# Patient Record
Sex: FEMALE | Race: AMERICAN INDIAN OR ALASKA NATIVE | Employment: OTHER | URBAN - METROPOLITAN AREA
[De-identification: names, ages, dates, MRNs, and addresses within clinical notes are randomized per-mention and may not be internally consistent; named-entity substitution may affect disease eponyms.]

---

## 2017-05-05 ENCOUNTER — OFFICE VISIT (OUTPATIENT)
Dept: CARDIOLOGY CLINIC | Age: 62
End: 2017-05-05

## 2017-05-05 VITALS
BODY MASS INDEX: 33.62 KG/M2 | RESPIRATION RATE: 18 BRPM | WEIGHT: 209.2 LBS | HEART RATE: 76 BPM | SYSTOLIC BLOOD PRESSURE: 126 MMHG | DIASTOLIC BLOOD PRESSURE: 90 MMHG | HEIGHT: 66 IN | OXYGEN SATURATION: 95 %

## 2017-05-05 DIAGNOSIS — I10 ESSENTIAL HYPERTENSION: ICD-10-CM

## 2017-05-05 DIAGNOSIS — R07.9 CHEST PAIN, UNSPECIFIED TYPE: Primary | ICD-10-CM

## 2017-05-05 DIAGNOSIS — E78.2 MIXED HYPERLIPIDEMIA: ICD-10-CM

## 2017-05-05 DIAGNOSIS — R00.2 HEART PALPITATIONS: ICD-10-CM

## 2017-05-05 RX ORDER — BISMUTH SUBSALICYLATE 262 MG
1 TABLET,CHEWABLE ORAL DAILY
COMMUNITY

## 2017-05-05 RX ORDER — IBUPROFEN 800 MG/1
TABLET ORAL
COMMUNITY

## 2017-05-05 NOTE — PROGRESS NOTES
Chief Complaint   Patient presents with   [facility] Patient     chest pain in middle of chest that radiates to shoulder on and off

## 2017-05-05 NOTE — PROGRESS NOTES
[id number] [facility]        [phone]                             NEW PATIENT HPI/FOLLOW-UP    NAME:  Jonh Neumann   :   1955   MRN:   [id number]   PCP:  Nancy Morejon MD           Subjective: The patient is a 57y.o. year old female with PMHx of dyslipidemia, HTN, chest pain  with normal stress test who returns after long hiatus with hx of recurrent precordial chest tightness more left sided radiating into left shoulder and jaw. Occurred 1 week ago at rest. Lasted few hrs though unsure. No recurrence. Felt it may have been due to sleeping wrong. Also admits to sweta episodes of rapid palpitations which occur at anytime( not at night awakening from sleep) every few months and transient. Denies change in exercise tolerance, edema, medication intolerance, shortness of breath unless really pushing herself, PND/orthopnea wheezing, sputum, syncope, dizziness or light headedness.       Review of Systems:     [] Unable to obtain  ROS due to  []mental status change  []sedated   []intubated   [x]Total of 12 systems reviewed as follows:  Constitutional: negative fever, negative chills, negative weight loss  Eyes:   negative visual changes  ENT:   negative sore throat, tongue or lip swelling  Chest/Resp:  negative cough, wheezing, negative dyspnea,tenderness  Cards:  +chest pain, -decreased exercise endurance, +palpitations, lower extremity edema,PND,orthopnea,syncope,dizziness,lightheadedness  GI:   negative for nausea, vomiting, diarrhea, and abdominal pain  :  negative for frequency, dysuria  Integument:  negative for rash and pruritus  Heme:  negative for easy bruising and gum/nose bleeding  Musculoskel: negative for myalgias,  back pain and muscle weakness  Neuro:  negative for headaches, dizziness, vertigo  Psych:  negative for feelings of anxiety, depression     Past Medical History:   Diagnosis Date    Arthritis     GERD (gastroesophageal reflux disease)  Hypertension     Ill-defined condition     elevated cholesterol    Ill-defined condition     seasonal allergies     Patient Active Problem List    Diagnosis Date Noted    Chest pain 05/05/2017    Mixed hyperlipidemia 05/05/2017    Essential hypertension 05/05/2017      Past Surgical History:   Procedure Laterality Date    BREAST SURGERY PROCEDURE UNLISTED      cysts removed bilateral breasts    HX ORTHOPAEDIC  10/27/15    C4-C6 ANTERIOR CERVICAL DISCECTOMY WITH FUSION    HX OTHER SURGICAL      colonoscopy     Allergies   Allergen Reactions    Latex Rash      Family History   Problem Relation Age of Onset    Diabetes Mother     Lung Disease Father     Breast Cancer Sister      52's      Social History     Social History    Marital status: SINGLE     Spouse name: N/A    Number of children: N/A    Years of education: N/A     Occupational History    Not on file. Social History Main Topics    Smoking status: Former Smoker     Packs/day: 0.25     Years: 0.50    Smokeless tobacco: Never Used    Alcohol use Yes      Comment: rare    Drug use: No    Sexual activity: Not on file     Other Topics Concern    Not on file     Social History Narrative      Current Outpatient Prescriptions   Medication Sig    multivitamin (ONE A DAY) tablet Take 1 Tab by mouth daily.  ibuprofen (MOTRIN) 800 mg tablet Take  by mouth.  oxyCODONE IR (ROXICODONE) 10 mg tab immediate release tablet Take 0.5-1 Tabs by mouth every three (3) hours as needed. Max Daily Amount: 80 mg.    diazepam (VALIUM) 5 mg tablet Take 1 Tab by mouth every eight (8) hours as needed (Spasms). Max Daily Amount: 15 mg.  cholecalciferol, vitamin D3, (VITAMIN D3) 2,000 unit tab Take 2,000 Units by mouth. Indications: VITAMIN D DEFICIENCY    B.infantis-B.ani-B.long-B.bifi (PROBIOTIC 4X) 10-15 mg TbEC Take 1 Tab by mouth daily.  aspirin delayed-release 81 mg tablet Take 81 mg by mouth daily.     Cetirizine (ZYRTEC) 10 mg cap Take  by mouth.    Hydrochlorothiazide (HYDRODIURIL) 12.5 mg tablet Take 12.5 mg by mouth daily.  simvastatin (ZOCOR) 20 mg tablet Take 20 mg by mouth nightly. No current facility-administered medications for this visit. I have reviewed the nurses notes, vitals, problem list, allergy list, medical history, family medical, social history and medications. Objective:     Physical Exam:     Vitals:    05/05/17 1423 05/05/17 1436   BP: 130/90 126/90   Pulse: 76    Resp: 18    SpO2: 95%    Weight: 209 lb 3.2 oz (94.9 kg)    Height: 5' 6\" (1.676 m)     Body mass index is 33.77 kg/(m^2). General: Well developed, in no acute distress. HEENT: No carotid bruits, no JVD, trach is midline. Heart:  Normal S1/S2 negative S3 or S4. Regular, soft Gr 1/6 systolic murmur, -gallop or rub.   Respiratory: Clear bilaterally, no wheezing or rales  Abdomen:   Soft, non-tender, bowel sounds are active.   Extremities:  No edema, normal cap refill, no cyanosis. Neuro: A&Ox3, speech clear, gait stable. Skin: Skin color is normal. No rashes or lesions. No diaphoresis. Vascular: 2+ pulses symmetric in all extremities        Data Review:       Cardiographics:    EKG: NSR with barely perceptible P waves--can't exclude ectopic atrial rhythm.     Cardiology Labs:    Results for orders placed or performed during the hospital encounter of 10/20/15   EKG, 12 LEAD, INITIAL   Result Value Ref Range    Ventricular Rate 69 BPM    Atrial Rate 69 BPM    P-R Interval 158 ms    QRS Duration 84 ms    Q-T Interval 410 ms    QTC Calculation (Bezet) 439 ms    Calculated P Axis 63 degrees    Calculated R Axis 45 degrees    Calculated T Axis 56 degrees    Diagnosis       Normal sinus rhythm with sinus arrhythmia  Nonspecific T wave abnormality  Confirmed by Jojo Carbajal ([id number]) on 10/20/2015 10:32:27 PM         No results found for: CHOL, CHOLX, CHLST, CHOLV, 687754, HDL, LDL, DLDL, LDLC, DLDLP, TGL, TGLX, TRIGL, [id number], TRIGP, [facility], [facility]    Lab Results   Component Value Date/Time    Sodium 143 10/20/2015 09:30 AM    Potassium 3.7 10/20/2015 09:30 AM    Chloride 105 10/20/2015 09:30 AM    CO2 30 10/20/2015 09:30 AM    Anion gap 8 10/20/2015 09:30 AM    Glucose 93 10/20/2015 09:30 AM    BUN 9 10/20/2015 09:30 AM    Creatinine 0.70 10/20/2015 09:30 AM    BUN/Creatinine ratio 13 10/20/2015 09:30 AM    GFR est AA >60 10/20/2015 09:30 AM    GFR est non-AA >60 10/20/2015 09:30 AM    Calcium 8.7 10/20/2015 09:30 AM    Bilirubin, total 0.4 10/20/2015 09:30 AM    AST (SGOT) 17 10/20/2015 09:30 AM    Alk. phosphatase 87 10/20/2015 09:30 AM    Protein, total 7.7 10/20/2015 09:30 AM    Albumin 3.8 10/20/2015 09:30 AM    Globulin 3.9 10/20/2015 09:30 AM    A-G Ratio 1.0 10/20/2015 09:30 AM    ALT (SGPT) 22 10/20/2015 09:30 AM          Assessment:       ICD-10-CM ICD-9-CM    1. Chest pain, atypical R07.9 786.50 AMB POC EKG ROUTINE W/ 12 LEADS, INTER & REP      STRESS TEST CARDIAC      2D ECHO COMPLETE ADULT (TTE) W OR WO CONTR   2. Mixed hyperlipidemia E78.2 272.2 STRESS TEST CARDIAC      2D ECHO COMPLETE ADULT (TTE) W OR WO CONTR   3. Essential hypertension I10 401.9 STRESS TEST CARDIAC      2D ECHO COMPLETE ADULT (TTE) W OR WO CONTR   4. Heart palpitations R00.2 785.1 STRESS TEST CARDIAC      2D ECHO COMPLETE ADULT (TTE) W OR WO CONTR         Discussion: Patient presents at this time with recurrent atypical chest pain likely MS/costochondral. Doubt cardiac but will exclude with routine stress test. Will obtain echo to exclude structural heart disease. EM or ILR if palpitations more frequent. Call should this occur. Plan: 1. Continue same meds. Lipid profile and labs followed by PCP. 2.Encouraged to exercise to tolerance, lose weight and follow low fat, low cholesterol, low sodium predominantly Plant-based (consider Mediterranean) diet. Call with questions or concerns. Will follow up any test results by phone and/or f/u here in office if needed. Jayden Kerr 3.Follow up: 6 months    I have discussed the diagnosis with the patient and the intended plan as seen in the above orders. The patient has received an after-visit summary and questions were answered concerning future plans. I have discussed any concerning medication side effects and warnings with the patient as well.     Doreen Fernandez MD  5/5/2017

## 2017-05-09 ENCOUNTER — CLINICAL SUPPORT (OUTPATIENT)
Dept: CARDIOLOGY CLINIC | Age: 62
End: 2017-05-09

## 2017-05-09 DIAGNOSIS — I10 ESSENTIAL HYPERTENSION: ICD-10-CM

## 2017-05-09 DIAGNOSIS — R07.9 CHEST PAIN, UNSPECIFIED TYPE: ICD-10-CM

## 2017-05-09 DIAGNOSIS — R00.2 HEART PALPITATIONS: ICD-10-CM

## 2017-05-09 DIAGNOSIS — E78.2 MIXED HYPERLIPIDEMIA: ICD-10-CM

## 2017-05-10 ENCOUNTER — CLINICAL SUPPORT (OUTPATIENT)
Dept: CARDIOLOGY CLINIC | Age: 62
End: 2017-05-10

## 2017-05-10 DIAGNOSIS — R00.2 HEART PALPITATIONS: ICD-10-CM

## 2017-05-10 DIAGNOSIS — I10 ESSENTIAL HYPERTENSION: ICD-10-CM

## 2017-05-10 DIAGNOSIS — E78.2 MIXED HYPERLIPIDEMIA: ICD-10-CM

## 2017-05-10 DIAGNOSIS — R07.9 CHEST PAIN, UNSPECIFIED TYPE: ICD-10-CM

## 2017-05-11 ENCOUNTER — TELEPHONE (OUTPATIENT)
Dept: CARDIOLOGY CLINIC | Age: 62
End: 2017-05-11

## 2017-05-11 NOTE — TELEPHONE ENCOUNTER
Spoke with patient. Verified patient with two patient identifiers. Advised plain EST was abnormal.  Agrees to proceed with Nuc EST. Advised if that test was abnormal, would need cardiac cath. Will ask PSR to call pt to schedule Nuc EST. Patient verbalized understanding. Was abnormal                  Let her know would recommend walking nuclear medicine stress test or cardiac cath                  Would recommend walking NST to exclude blocked arteries.  Tell her would rather do this NST first then cardiac cath if abnormal.                  thx                  b

## 2017-05-15 ENCOUNTER — TELEPHONE (OUTPATIENT)
Dept: CARDIOLOGY CLINIC | Age: 62
End: 2017-05-15

## 2017-05-15 NOTE — TELEPHONE ENCOUNTER
----- Message from Ben Murray MD sent at 5/14/2017  9:51 PM EDT -----  Regarding: echo  Normal except for mild leakage    Repeat 1-2 yrs    b

## 2017-05-15 NOTE — TELEPHONE ENCOUNTER
Spoke with patient. Verified patient with two patient identifiers. Discussed echo results with pt. Repeat echo in 1-2 years. Keep appt for card EST next week. Patient verbalized understanding.

## 2017-05-23 ENCOUNTER — CLINICAL SUPPORT (OUTPATIENT)
Dept: CARDIOLOGY CLINIC | Age: 62
End: 2017-05-23

## 2017-05-23 DIAGNOSIS — R07.9 CHEST PAIN, UNSPECIFIED TYPE: Primary | ICD-10-CM

## 2017-05-25 ENCOUNTER — TELEPHONE (OUTPATIENT)
Dept: CARDIOLOGY CLINIC | Age: 62
End: 2017-05-25

## 2017-05-25 NOTE — TELEPHONE ENCOUNTER
----- Message from [address], MD sent at 5/25/2017 12:47 AM EDT -----  Stress test normal, thx.

## 2017-05-26 NOTE — PROGRESS NOTES
Spoke with patient  Verified patient with 2 patient identifier  Informed per Dr German Guallpa stress test NORMAL     F/U TO DISCUSS IF DESIRED  Patient verbalized understanding.

## 2017-06-06 ENCOUNTER — TELEPHONE (OUTPATIENT)
Dept: CARDIOLOGY CLINIC | Age: 62
End: 2017-06-06

## 2017-06-07 NOTE — TELEPHONE ENCOUNTER
Spoke with patient. Verified patient with two patient identifiers. Discussed echo results again. Reiterated mildly leaky. Of no concern at this time. Repeat echo 1-2 years. Patient verbalized understanding.

## 2017-10-24 ENCOUNTER — HOSPITAL ENCOUNTER (OUTPATIENT)
Dept: MAMMOGRAPHY | Age: 62
Discharge: HOME | End: 2017-10-24
Attending: OBSTETRICS & GYNECOLOGY
Payer: COMMERCIAL

## 2017-10-24 DIAGNOSIS — Z12.31 VISIT FOR SCREENING MAMMOGRAM: ICD-10-CM

## 2017-10-24 PROCEDURE — 77067 SCR MAMMO BI INCL CAD: CPT

## 2017-10-26 ENCOUNTER — HOSPITAL ENCOUNTER (OUTPATIENT)
Dept: MAMMOGRAPHY | Age: 62
Discharge: HOME | End: 2017-10-26
Attending: OBSTETRICS & GYNECOLOGY
Payer: COMMERCIAL

## 2017-10-26 DIAGNOSIS — R92.8 ABNORMAL MAMMOGRAM: ICD-10-CM

## 2017-10-26 PROCEDURE — 77065 DX MAMMO INCL CAD UNI: CPT

## 2017-10-26 NOTE — PROGRESS NOTES
Dr. Mitra Cruz informed nurse that she just talked with Dr. Hiram Cloud and he stated stereotactic left breast biopsy could be completed here.

## 2017-10-26 NOTE — PROGRESS NOTES
Mammo result routed to Dr. Rosie Garcia. Faxed order sheet to Dr. Tom Borges to sign and refax so pt. Can be scheduled for her appt. For her procedure.

## 2017-10-31 ENCOUNTER — HOSPITAL ENCOUNTER (OUTPATIENT)
Dept: MAMMOGRAPHY | Age: 62
Discharge: HOME | End: 2017-10-31
Attending: OBSTETRICS & GYNECOLOGY
Payer: COMMERCIAL

## 2017-10-31 VITALS — RESPIRATION RATE: 20 BRPM | BODY MASS INDEX: 32.62 KG/M2 | HEIGHT: 66 IN | WEIGHT: 203 LBS

## 2017-10-31 DIAGNOSIS — R92.8 ABNORMAL MAMMOGRAM: ICD-10-CM

## 2017-10-31 DIAGNOSIS — R92.0 MAMMOGRAPHIC MICROCALCIFICATION: ICD-10-CM

## 2017-10-31 PROCEDURE — 77065 DX MAMMO INCL CAD UNI: CPT

## 2017-10-31 PROCEDURE — 19081 BX BREAST 1ST LESION STRTCTC: CPT

## 2017-10-31 PROCEDURE — 74011000250 HC RX REV CODE- 250: Performed by: OBSTETRICS & GYNECOLOGY

## 2017-10-31 PROCEDURE — 88305 TISSUE EXAM BY PATHOLOGIST: CPT | Performed by: OBSTETRICS & GYNECOLOGY

## 2017-10-31 PROCEDURE — 74011000250 HC RX REV CODE- 250: Performed by: RADIOLOGY

## 2017-10-31 RX ORDER — LIDOCAINE HYDROCHLORIDE 10 MG/ML
12 INJECTION, SOLUTION EPIDURAL; INFILTRATION; INTRACAUDAL; PERINEURAL
Status: COMPLETED | OUTPATIENT
Start: 2017-10-31 | End: 2017-10-31

## 2017-10-31 RX ORDER — SODIUM BICARBONATE 84 MG/ML
2.5 INJECTION, SOLUTION INTRAVENOUS
Status: COMPLETED | OUTPATIENT
Start: 2017-10-31 | End: 2017-10-31

## 2017-10-31 RX ADMIN — LIDOCAINE HYDROCHLORIDE 8 MG: 10; .005 INJECTION, SOLUTION EPIDURAL; INFILTRATION; INTRACAUDAL; PERINEURAL at 12:00

## 2017-10-31 RX ADMIN — SODIUM BICARBONATE 2.5 MEQ: 84 INJECTION, SOLUTION INTRAVENOUS at 17:00

## 2017-10-31 RX ADMIN — LIDOCAINE HYDROCHLORIDE 12 ML: 10 INJECTION, SOLUTION EPIDURAL; INFILTRATION; INTRACAUDAL; PERINEURAL at 12:00

## 2017-10-31 NOTE — ROUTINE PROCESS
Dr. Iline Snellen Present for pre procedure consult and consent - questions reviewed with understanding - consent signed. Discharge instructions given and reviewed in entirety - copy given to pt - Ms. Jarvis Peralta verbalized and signed for understanding of all reviewed - will review again at discharge.

## 2017-10-31 NOTE — ROUTINE PROCESS
Discharge instructions reviewed with understanding, questions reviewed, copy given to patient. Post procedure Mammo completed and reviewed MD, pt cleared for discharge. Biopsy site clean and dry, hemostasis achieved. Steri strips with DSD placed. Ice pack held by patient. Pt verbalized she would like to be notified by phone of any results. Pt encouraged to call department if she has not received her results in 3-5 business days. Pt advised not answer cell phone call while driving. Specimen carried to lab by RN.

## 2017-10-31 NOTE — DISCHARGE INSTRUCTIONS
MD Terence Aceves, RN     [phone]                                        Instructions Following Your Breast Biopsy         Pain Control    · Tylenol should be taken as directed on the back of the bottle (janey 4-6 hours) for the next 24 hours post breast biopsy unless directed otherwise by a physician. · No Aspirin or Anti-Inflammatory  (Motrin, Advil ) medications for 24 hours. · Wearing a soft, comfortable (Wire free ) bra, even at night, for 24 hours is helpful. · Use a clean, covered ice pack over the site every 1- 2 hours til bedtime, for 20-30 minutes at a time for the first 24 hours. Biopsy Site     · A small amount of bleeding and /or oozing from the Biopsy site is normal, as well as bloody nipple discharge, which is rare. · You may notice bruising on the skin over the next few days. · Steri Strips and a dressing have been applied. · The steri strips can remain on for up to 5 days. · The clean dressing can be removed in 48 hours, however, if the dressing becomes loose, causes redness or irritation of the skin or any drainage has collected, please remove it an replace it with a Band-Aid. · Avoid getting the Biopsy site wet for 48 hours, avoid showering, swimming and hot tubs. · Should you have excessive bleeding or pain, please seek medical treatment or call                 [address] [phone], 7:30 - 4:00 p.m. After hours (ask to speak to the on-call Radiology Nurse-RN)                        [phone] or [phone]      Activity      · Rest the day of the biopsy, avoiding strenuous activities and any heavy lifting. · You may resume most activities/ work the following day. Pathology Report     · The results of your Pathology report, from the laboratory, should be available in 3-5 business days.   The Radiologist will review your results and, based on your preference, we will be happy to provide results to you by phone or in person. · You will also be advised, at that time, of any further appointments or follow- up you may need.

## 2017-10-31 NOTE — IP AVS SNAPSHOT
[address] [facility]. 
[phone] Patient: Carolyn Valente MRN: [id number] SGY:[birth year] About your hospitalization You were admitted on:  October 31, 2017 You last received care in the:  [address] [place] [address] You were discharged on:  October 31, 2017 Why you were hospitalized Your primary diagnosis was:  Not on File Discharge Orders None A check sobia indicates which time of day the medication should be taken. My Medications ASK your physician about these medications Instructions Each Dose to Equal  
 Morning Noon Evening Bedtime  
 aspirin delayed-release 81 mg tablet Your last dose was: Your next dose is: Take 81 mg by mouth daily. 81 mg  
    
   
   
   
  
 diazePAM 5 mg tablet Commonly known as:  VALIUM Your last dose was: Your next dose is: Take 1 Tab by mouth every eight (8) hours as needed (Spasms). Max Daily Amount: 15 mg.  
 5 mg  
    
   
   
   
  
 hydroCHLOROthiazide 12.5 mg tablet Commonly known as:  HYDRODIURIL Your last dose was: Your next dose is: Take 12.5 mg by mouth daily. 12.5 mg  
    
   
   
   
  
 ibuprofen 800 mg tablet Commonly known as:  MOTRIN Your last dose was: Your next dose is: Take  by mouth.  
     
   
   
   
  
 multivitamin tablet Commonly known as:  ONE A DAY Your last dose was: Your next dose is: Take 1 Tab by mouth daily. 1 Tab  
    
   
   
   
  
 oxyCODONE IR 10 mg Tab immediate release tablet Commonly known as:  Charolet Music Your last dose was: Your next dose is: Take 0.5-1 Tabs by mouth every three (3) hours as needed. Max Daily Amount: 80 mg.  
 5-10 mg PROBIOTIC 4X 10-15 mg Tbec Generic drug:  B.infantis-B.ani-B.long-B.bifi Your last dose was: Your next dose is: Take 1 Tab by mouth daily. 1 Tab  
    
   
   
   
  
 simvastatin 20 mg tablet Commonly known as:  ZOCOR Your last dose was: Your next dose is: Take 20 mg by mouth nightly. 20 mg  
    
   
   
   
  
 VITAMIN D3 2,000 unit Tab Generic drug:  cholecalciferol (vitamin D3) Your last dose was: Your next dose is: Take 2,000 Units by mouth. Indications: VITAMIN D DEFICIENCY  
 2000 Units ZyrTEC 10 mg Cap Generic drug:  Cetirizine Your last dose was: Your next dose is: Take  by mouth. Discharge Instructions MD Jason Donaldson RT Arneta Reach, RN     [phone] Instructions Following Your Breast Biopsy Pain Control · Tylenol should be taken as directed on the back of the bottle (janey 4-6 hours) for the next 24 hours post breast biopsy unless directed otherwise by a physician. · No Aspirin or Anti-Inflammatory  (Motrin, Advil ) medications for 24 hours. · Wearing a soft, comfortable (Wire free ) bra, even at night, for 24 hours is helpful. · Use a clean, covered ice pack over the site every 1- 2 hours til bedtime, for 20-30 minutes at a time for the first 24 hours. Biopsy Site · A small amount of bleeding and /or oozing from the Biopsy site is normal, as well as bloody nipple discharge, which is rare. · You may notice bruising on the skin over the next few days. · Steri Strips and a dressing have been applied. · The steri strips can remain on for up to 5 days.  
· The clean dressing can be removed in 48 hours, however, if the dressing becomes loose, causes redness or irritation of the skin or any drainage has collected, please remove it an replace it with a Band-Aid. · Avoid getting the Biopsy site wet for 48 hours, avoid showering, swimming and hot tubs. · Should you have excessive bleeding or pain, please seek medical treatment or call [address] [phone], 7:30 - 4:00 p.m. After hours (ask to speak to the on-call Radiology Nurse-RN)                        [phone] or [phone] Activity · Rest the day of the biopsy, avoiding strenuous activities and any heavy lifting. · You may resume most activities/ work the following day. Pathology Report · The results of your Pathology report, from the laboratory, should be available in 3-5 business days. The Radiologist will review your results and, based on your preference, we will be happy to provide results to you by phone or in person. · You will also be advised, at that time, of any further appointments or follow- up you may need. Introducing [facility]! Peggy Love introduces P2i patient portal. Now you can access parts of your medical record, email your doctor's office, and request medication refills online. 1. In your internet browser, go to [email or web address] 2. Click on the First Time User? Click Here link in the Sign In box. You will see the New Member Sign Up page. 3. Enter your P2i Access Code exactly as it appears below. You will not need to use this code after youve completed the sign-up process. If you do not sign up before the expiration date, you must request a new code. · P2i Access Code: [email or web address] Expires: 1/11/2018 11:33 AM 
 
4. Enter the last four digits of your Social Security Number (xxxx) and Date of Birth (mm/dd/yyyy) as indicated and click Submit. You will be taken to the next sign-up page. 5. Create a P2i ID.  This will be your P2i login ID and cannot be changed, so think of one that is secure and easy to remember. 6. Create a Work4 password. You can change your password at any time. 7. Enter your Password Reset Question and Answer. This can be used at a later time if you forget your password. 8. Enter your e-mail address. You will receive e-mail notification when new information is available in [address] 9. Click Sign Up. You can now view and download portions of your medical record. 10. Click the Download Summary menu link to download a portable copy of your medical information. If you have questions, please visit the Frequently Asked Questions section of the Work4 website. Remember, Work4 is NOT to be used for urgent needs. For medical emergencies, dial 911. Now available from your iPhone and Android! Providers Seen During Your Hospitalization Provider Specialty Primary office phone Roman Moscoso MD Gynecology [phone] Your Primary Care Physician (PCP) Primary Care Physician Office Phone Office Fax Steph Moeller [phone] You are allergic to the following Allergen Reactions Latex Rash Recent Documentation Height Weight Breastfeeding? BMI OB Status Smoking Status 1.676 m 92.1 kg No 32.77 kg/m2 Postmenopausal Former Smoker Emergency Contacts Name Discharge Info Relation Home Work Mobile Crystal Pickett DISCHARGE CAREGIVER [3] Other Relative [6] [phone] Patient Belongings The following personal items are in your possession at time of discharge: 
     Visual Aid: Glasses Please provide this summary of care documentation to your next provider. Signatures-by signing, you are acknowledging that this After Visit Summary has been reviewed with you and you have received a copy. Patient Signature:  ____________________________________________________________ Date:  ____________________________________________________________  
  
Carla  Provider Signature:  ____________________________________________________________ Date:  ____________________________________________________________

## 2017-11-01 NOTE — PROGRESS NOTES
Dr. Renny Valencia reviewed pathology report and recommended to continue yearly screening mammogram follow up. Ms Teodora Cortés was notified of benign results and recommendation. Dr. Shahid Corley was faxed a copy of the pathology report with follow up recomendations. Letter sent to pt.

## 2018-11-09 ENCOUNTER — HOSPITAL ENCOUNTER (OUTPATIENT)
Dept: MAMMOGRAPHY | Age: 63
Discharge: HOME | End: 2018-11-09
Attending: OBSTETRICS & GYNECOLOGY
Payer: MEDICARE

## 2018-11-09 DIAGNOSIS — Z12.39 SCREENING BREAST EXAMINATION: ICD-10-CM

## 2018-11-09 PROCEDURE — 77067 SCR MAMMO BI INCL CAD: CPT

## 2019-03-19 ENCOUNTER — HOSPITAL ENCOUNTER (EMERGENCY)
Age: 64
Discharge: HOME | End: 2019-03-19
Attending: EMERGENCY MEDICINE
Payer: MEDICARE

## 2019-03-19 ENCOUNTER — APPOINTMENT (OUTPATIENT)
Dept: GENERAL RADIOLOGY | Age: 64
End: 2019-03-19
Attending: EMERGENCY MEDICINE
Payer: MEDICARE

## 2019-03-19 VITALS
DIASTOLIC BLOOD PRESSURE: 62 MMHG | SYSTOLIC BLOOD PRESSURE: 137 MMHG | BODY MASS INDEX: 33.87 KG/M2 | HEIGHT: 65 IN | RESPIRATION RATE: 17 BRPM | WEIGHT: 203.26 LBS | OXYGEN SATURATION: 97 % | TEMPERATURE: 100 F | HEART RATE: 90 BPM

## 2019-03-19 DIAGNOSIS — R05.9 COUGH: ICD-10-CM

## 2019-03-19 DIAGNOSIS — J10.1 INFLUENZA A: Primary | ICD-10-CM

## 2019-03-19 DIAGNOSIS — R52 BODY ACHES: ICD-10-CM

## 2019-03-19 DIAGNOSIS — R77.8 ELEVATED TROPONIN: ICD-10-CM

## 2019-03-19 LAB
ALBUMIN SERPL-MCNC: 3.9 G/DL (ref 3.5–5)
ALBUMIN/GLOB SERPL: 0.9 {RATIO} (ref 1.1–2.2)
ALP SERPL-CCNC: 90 U/L (ref 45–117)
ALT SERPL-CCNC: 23 U/L (ref 12–78)
ANION GAP SERPL CALC-SCNC: 8 MMOL/L (ref 5–15)
APPEARANCE UR: CLEAR
AST SERPL-CCNC: 25 U/L (ref 15–37)
ATRIAL RATE: 100 BPM
ATRIAL RATE: 92 BPM
BACTERIA URNS QL MICRO: NEGATIVE /HPF
BASOPHILS # BLD: 0 K/UL (ref 0–0.1)
BASOPHILS NFR BLD: 0 % (ref 0–1)
BILIRUB SERPL-MCNC: 0.3 MG/DL (ref 0.2–1)
BILIRUB UR QL: NEGATIVE
BUN SERPL-MCNC: 7 MG/DL (ref 6–20)
BUN/CREAT SERPL: 10 (ref 12–20)
CALCIUM SERPL-MCNC: 8.9 MG/DL (ref 8.5–10.1)
CALCULATED P AXIS, ECG09: 51 DEGREES
CALCULATED P AXIS, ECG09: 61 DEGREES
CALCULATED R AXIS, ECG10: 25 DEGREES
CALCULATED R AXIS, ECG10: 46 DEGREES
CALCULATED T AXIS, ECG11: -11 DEGREES
CALCULATED T AXIS, ECG11: -16 DEGREES
CHLORIDE SERPL-SCNC: 102 MMOL/L (ref 97–108)
CO2 SERPL-SCNC: 27 MMOL/L (ref 21–32)
COLOR UR: ABNORMAL
CREAT SERPL-MCNC: 0.69 MG/DL (ref 0.55–1.02)
DIAGNOSIS, 93000: NORMAL
DIAGNOSIS, 93000: NORMAL
DIFFERENTIAL METHOD BLD: ABNORMAL
EOSINOPHIL # BLD: 0 K/UL (ref 0–0.4)
EOSINOPHIL NFR BLD: 0 % (ref 0–7)
EPITH CASTS URNS QL MICRO: ABNORMAL /LPF
ERYTHROCYTE [DISTWIDTH] IN BLOOD BY AUTOMATED COUNT: 13 % (ref 11.5–14.5)
FLUAV AG NPH QL IA: POSITIVE
FLUBV AG NOSE QL IA: NEGATIVE
GLOBULIN SER CALC-MCNC: 4.3 G/DL (ref 2–4)
GLUCOSE SERPL-MCNC: 118 MG/DL (ref 65–100)
GLUCOSE UR STRIP.AUTO-MCNC: NEGATIVE MG/DL
HCT VFR BLD AUTO: 41.2 % (ref 35–47)
HGB BLD-MCNC: 13.7 G/DL (ref 11.5–16)
HGB UR QL STRIP: NEGATIVE
IMM GRANULOCYTES # BLD AUTO: 0 K/UL (ref 0–0.04)
IMM GRANULOCYTES NFR BLD AUTO: 0 % (ref 0–0.5)
KETONES UR QL STRIP.AUTO: NEGATIVE MG/DL
LACTATE SERPL-SCNC: 0.8 MMOL/L (ref 0.4–2)
LEUKOCYTE ESTERASE UR QL STRIP.AUTO: ABNORMAL
LYMPHOCYTES # BLD: 0.4 K/UL (ref 0.8–3.5)
LYMPHOCYTES NFR BLD: 6 % (ref 12–49)
MCH RBC QN AUTO: 29.6 PG (ref 26–34)
MCHC RBC AUTO-ENTMCNC: 33.3 G/DL (ref 30–36.5)
MCV RBC AUTO: 89 FL (ref 80–99)
MONOCYTES # BLD: 0.6 K/UL (ref 0–1)
MONOCYTES NFR BLD: 8 % (ref 5–13)
MUCOUS THREADS URNS QL MICRO: ABNORMAL /LPF
NEUTS SEG # BLD: 6.2 K/UL (ref 1.8–8)
NEUTS SEG NFR BLD: 86 % (ref 32–75)
NITRITE UR QL STRIP.AUTO: NEGATIVE
NRBC # BLD: 0 K/UL (ref 0–0.01)
NRBC BLD-RTO: 0 PER 100 WBC
P-R INTERVAL, ECG05: 148 MS
P-R INTERVAL, ECG05: 160 MS
PH UR STRIP: 6 [PH] (ref 5–8)
PLATELET # BLD AUTO: 198 K/UL (ref 150–400)
PMV BLD AUTO: 10.4 FL (ref 8.9–12.9)
POTASSIUM SERPL-SCNC: 3.5 MMOL/L (ref 3.5–5.1)
PROT SERPL-MCNC: 8.2 G/DL (ref 6.4–8.2)
PROT UR STRIP-MCNC: NEGATIVE MG/DL
Q-T INTERVAL, ECG07: 314 MS
Q-T INTERVAL, ECG07: 354 MS
QRS DURATION, ECG06: 88 MS
QRS DURATION, ECG06: 90 MS
QTC CALCULATION (BEZET), ECG08: 405 MS
QTC CALCULATION (BEZET), ECG08: 437 MS
RBC # BLD AUTO: 4.63 M/UL (ref 3.8–5.2)
RBC #/AREA URNS HPF: ABNORMAL /HPF (ref 0–5)
RBC MORPH BLD: ABNORMAL
SODIUM SERPL-SCNC: 137 MMOL/L (ref 136–145)
SP GR UR REFRACTOMETRY: 1.02 (ref 1–1.03)
TROPONIN I SERPL-MCNC: 0.14 NG/ML
TROPONIN I SERPL-MCNC: 0.16 NG/ML
UA: UC IF INDICATED,UAUC: ABNORMAL
UROBILINOGEN UR QL STRIP.AUTO: 1 EU/DL (ref 0.2–1)
VENTRICULAR RATE, ECG03: 100 BPM
VENTRICULAR RATE, ECG03: 92 BPM
WBC # BLD AUTO: 7.2 K/UL (ref 3.6–11)
WBC URNS QL MICRO: ABNORMAL /HPF (ref 0–4)

## 2019-03-19 PROCEDURE — 74011000250 HC RX REV CODE- 250: Performed by: EMERGENCY MEDICINE

## 2019-03-19 PROCEDURE — 87086 URINE CULTURE/COLONY COUNT: CPT

## 2019-03-19 PROCEDURE — 99285 EMERGENCY DEPT VISIT HI MDM: CPT

## 2019-03-19 PROCEDURE — 80053 COMPREHEN METABOLIC PANEL: CPT

## 2019-03-19 PROCEDURE — 94640 AIRWAY INHALATION TREATMENT: CPT

## 2019-03-19 PROCEDURE — 87040 BLOOD CULTURE FOR BACTERIA: CPT

## 2019-03-19 PROCEDURE — 93005 ELECTROCARDIOGRAM TRACING: CPT

## 2019-03-19 PROCEDURE — 74011250637 HC RX REV CODE- 250/637: Performed by: EMERGENCY MEDICINE

## 2019-03-19 PROCEDURE — 74011250636 HC RX REV CODE- 250/636: Performed by: EMERGENCY MEDICINE

## 2019-03-19 PROCEDURE — 87804 INFLUENZA ASSAY W/OPTIC: CPT

## 2019-03-19 PROCEDURE — 77030029684 HC NEB SM VOL KT MONA -A

## 2019-03-19 PROCEDURE — 96361 HYDRATE IV INFUSION ADD-ON: CPT

## 2019-03-19 PROCEDURE — 84484 ASSAY OF TROPONIN QUANT: CPT

## 2019-03-19 PROCEDURE — 96374 THER/PROPH/DIAG INJ IV PUSH: CPT

## 2019-03-19 PROCEDURE — 36415 COLL VENOUS BLD VENIPUNCTURE: CPT

## 2019-03-19 PROCEDURE — 81001 URINALYSIS AUTO W/SCOPE: CPT

## 2019-03-19 PROCEDURE — 85025 COMPLETE CBC W/AUTO DIFF WBC: CPT

## 2019-03-19 PROCEDURE — 71046 X-RAY EXAM CHEST 2 VIEWS: CPT

## 2019-03-19 PROCEDURE — 83605 ASSAY OF LACTIC ACID: CPT

## 2019-03-19 RX ORDER — OSELTAMIVIR PHOSPHATE 75 MG/1
75 CAPSULE ORAL
Status: COMPLETED | OUTPATIENT
Start: 2019-03-19 | End: 2019-03-19

## 2019-03-19 RX ORDER — KETOROLAC TROMETHAMINE 30 MG/ML
15 INJECTION, SOLUTION INTRAMUSCULAR; INTRAVENOUS
Status: COMPLETED | OUTPATIENT
Start: 2019-03-19 | End: 2019-03-19

## 2019-03-19 RX ORDER — IPRATROPIUM BROMIDE AND ALBUTEROL SULFATE 2.5; .5 MG/3ML; MG/3ML
3 SOLUTION RESPIRATORY (INHALATION)
Status: COMPLETED | OUTPATIENT
Start: 2019-03-19 | End: 2019-03-19

## 2019-03-19 RX ORDER — ASPIRIN 325 MG
325 TABLET ORAL ONCE
Status: COMPLETED | OUTPATIENT
Start: 2019-03-19 | End: 2019-03-19

## 2019-03-19 RX ORDER — ACETAMINOPHEN 325 MG/1
650 TABLET ORAL
Status: COMPLETED | OUTPATIENT
Start: 2019-03-19 | End: 2019-03-19

## 2019-03-19 RX ORDER — BENZONATATE 100 MG/1
200 CAPSULE ORAL
Qty: 15 CAP | Refills: 0 | Status: SHIPPED | OUTPATIENT
Start: 2019-03-19 | End: 2019-04-10 | Stop reason: ALTCHOICE

## 2019-03-19 RX ORDER — OSELTAMIVIR PHOSPHATE 75 MG/1
75 CAPSULE ORAL 2 TIMES DAILY
Qty: 10 CAP | Refills: 0 | Status: SHIPPED | OUTPATIENT
Start: 2019-03-19 | End: 2019-03-24

## 2019-03-19 RX ADMIN — ACETAMINOPHEN 650 MG: 325 TABLET ORAL at 07:47

## 2019-03-19 RX ADMIN — SODIUM CHLORIDE 1000 ML: 900 INJECTION, SOLUTION INTRAVENOUS at 09:47

## 2019-03-19 RX ADMIN — ASPIRIN 325 MG: 325 TABLET ORAL at 10:30

## 2019-03-19 RX ADMIN — IPRATROPIUM BROMIDE AND ALBUTEROL SULFATE 3 ML: .5; 3 SOLUTION RESPIRATORY (INHALATION) at 08:14

## 2019-03-19 RX ADMIN — KETOROLAC TROMETHAMINE 15 MG: 30 INJECTION, SOLUTION INTRAMUSCULAR; INTRAVENOUS at 08:14

## 2019-03-19 RX ADMIN — OSELTAMIVIR PHOSPHATE 75 MG: 75 CAPSULE ORAL at 10:55

## 2019-03-19 NOTE — DISCHARGE INSTRUCTIONS
Patient Education        Influenza (Flu): Care Instructions  Your Care Instructions    Influenza (flu) is an infection in the lungs and breathing passages. It is caused by the influenza virus. There are different strains, or types, of the flu virus from year to year. Unlike the common cold, the flu comes on suddenly and the symptoms, such as a cough, congestion, fever, chills, fatigue, aches, and pains, are more severe. These symptoms may last up to 10 days. Although the flu can make you feel very sick, it usually doesn't cause serious health problems. Home treatment is usually all you need for flu symptoms. But your doctor may prescribe antiviral medicine to prevent other health problems, such as pneumonia, from developing. Older people and those who have a long-term health condition, such as lung disease, are most at risk for having pneumonia or other health problems. Follow-up care is a key part of your treatment and safety. Be sure to make and go to all appointments, and call your doctor if you are having problems. It's also a good idea to know your test results and keep a list of the medicines you take. How can you care for yourself at home? · Get plenty of rest.  · Drink plenty of fluids, enough so that your urine is light yellow or clear like water. If you have kidney, heart, or liver disease and have to limit fluids, talk with your doctor before you increase the amount of fluids you drink. · Take an over-the-counter pain medicine if needed, such as acetaminophen (Tylenol), ibuprofen (Advil, Motrin), or naproxen (Aleve), to relieve fever, headache, and muscle aches. Read and follow all instructions on the label. No one younger than 20 should take aspirin. It has been linked to Reye syndrome, a serious illness. · Do not smoke. Smoking can make the flu worse. If you need help quitting, talk to your doctor about stop-smoking programs and medicines.  These can increase your chances of quitting for good.  · Breathe moist air from a hot shower or from a sink filled with hot water to help clear a stuffy nose. · Before you use cough and cold medicines, check the label. These medicines may not be safe for young children or for people with certain health problems. · If the skin around your nose and lips becomes sore, put some petroleum jelly on the area. · To ease coughing:  ? Drink fluids to soothe a scratchy throat. ? Suck on cough drops or plain hard candy. ? Take an over-the-counter cough medicine that contains dextromethorphan to help you get some sleep. Read and follow all instructions on the label. ? Raise your head at night with an extra pillow. This may help you rest if coughing keeps you awake. · Take any prescribed medicine exactly as directed. Call your doctor if you think you are having a problem with your medicine. To avoid spreading the flu  · Wash your hands regularly, and keep your hands away from your face. · Stay home from school, work, and other public places until you are feeling better and your fever has been gone for at least 24 hours. The fever needs to have gone away on its own without the help of medicine. · Ask people living with you to talk to their doctors about preventing the flu. They may get antiviral medicine to keep from getting the flu from you. · To prevent the flu in the future, get a flu vaccine every fall. Encourage people living with you to get the vaccine. · Cover your mouth when you cough or sneeze. When should you call for help? Call 911 anytime you think you may need emergency care.  For example, call if:    · You have severe trouble breathing.    Call your doctor now or seek immediate medical care if:    · You have new or worse trouble breathing.     · You seem to be getting much sicker.     · You feel very sleepy or confused.     · You have a new or higher fever.     · You get a new rash.    Watch closely for changes in your health, and be sure to contact your doctor if:    · You begin to get better and then get worse.     · You are not getting better after 1 week. Where can you learn more? Go to http://sona-antionette.info/. Enter Q604 in the search box to learn more about \"Influenza (Flu): Care Instructions. \"  Current as of: September 5, 2018  Content Version: 11.9  © 4397-7577 "2,10E+07". Care instructions adapted under license by [employer] (which disclaims liability or warranty for this information). If you have questions about a medical condition or this instruction, always ask your healthcare professional. [place] [address] any warranty or liability for your use of this information.

## 2019-03-19 NOTE — ED PROVIDER NOTES
EMERGENCY DEPARTMENT HISTORY AND PHYSICAL EXAM      Date: 3/19/2019  Patient Name: Paz Smith    History of Presenting Illness     Chief Complaint   Patient presents with    Cough    Shortness of Breath       History Provided By: Patient    HPI: Paz Smith, 75 y.o. female with 2+ sick contacts with similar symptoms presenting with cough, shortness of breath and low-grade temperature beginning yesterday morning. Patient visited friend who has cancer and noticed he had a runny nose, sister also with similar symptoms. Patient has a history of high blood pressure, denies any tobacco use and has no history of lung disease. Reports cough, nonproductive, backaches, chills at home. Patient reports she did get a flu vaccine this past fall. Patient did not take any medications for her symptoms prior to arrival to the emergency department. Patient waited approximately 4 hours in the emergency department for being transitioned to a room for care. Patient also notes a scratchy throat, denies any difficulty breathing. Patient denies any foreign travel. Patient denies any diarrhea, pain with urination, constipation or blood in her stool. There are no other complaints, changes, or physical findings at this time. PCP: Keke Alegre MD    No current facility-administered medications on file prior to encounter. Current Outpatient Medications on File Prior to Encounter   Medication Sig Dispense Refill    multivitamin (ONE A DAY) tablet Take 1 Tab by mouth daily.  ibuprofen (MOTRIN) 800 mg tablet Take  by mouth.  oxyCODONE IR (ROXICODONE) 10 mg tab immediate release tablet Take 0.5-1 Tabs by mouth every three (3) hours as needed. Max Daily Amount: 80 mg. 150 Tab 0    diazepam (VALIUM) 5 mg tablet Take 1 Tab by mouth every eight (8) hours as needed (Spasms). Max Daily Amount: 15 mg. 90 Tab 5    cholecalciferol, vitamin D3, (VITAMIN D3) 2,000 unit tab Take 2,000 Units by mouth.  Indications: VITAMIN D DEFICIENCY      B.infantis-B.ani-B.long-B.bifi (PROBIOTIC 4X) 10-15 mg TbEC Take 1 Tab by mouth daily.  aspirin delayed-release 81 mg tablet Take 81 mg by mouth daily.  Cetirizine (ZYRTEC) 10 mg cap Take  by mouth.  Hydrochlorothiazide (HYDRODIURIL) 12.5 mg tablet Take 12.5 mg by mouth daily.  simvastatin (ZOCOR) 20 mg tablet Take 20 mg by mouth nightly. Past History     Past Medical History:  Past Medical History:   Diagnosis Date    Arthritis     GERD (gastroesophageal reflux disease)     Hypertension     Ill-defined condition     elevated cholesterol    Ill-defined condition     seasonal allergies       Past Surgical History:  Past Surgical History:   Procedure Laterality Date    BREAST SURGERY PROCEDURE UNLISTED      cysts removed bilateral breasts    HX BREAST BIOPSY Bilateral     long time ago--1980's    HX ORTHOPAEDIC  10/27/15    C4-C6 ANTERIOR CERVICAL DISCECTOMY WITH FUSION    HX OTHER SURGICAL      colonoscopy       Family History:  Family History   Problem Relation Age of Onset    Diabetes Mother     Lung Disease Father     Breast Cancer Sister         52's       Social History:  Social History     Tobacco Use    Smoking status: Former Smoker     Packs/day: 0.25     Years: 0.50     Pack years: 0.12    Smokeless tobacco: Never Used   Substance Use Topics    Alcohol use: Yes     Comment: rare    Drug use: No       Allergies: Allergies   Allergen Reactions    Latex Rash         Review of Systems   Review of Systems   Constitutional: Positive for chills and fever. Negative for activity change, appetite change, diaphoresis, fatigue and unexpected weight change. HENT: Positive for congestion, postnasal drip, rhinorrhea, sinus pressure, sneezing and sore throat. Negative for dental problem, ear discharge, ear pain, facial swelling, hearing loss, mouth sores, nosebleeds, tinnitus, trouble swallowing and voice change. Eyes: Negative for discharge. Respiratory: Positive for cough, choking and shortness of breath. Negative for apnea, chest tightness, wheezing and stridor. Genitourinary: Negative for difficulty urinating and hematuria. Musculoskeletal: Negative for back pain, joint swelling and myalgias. Neurological: Negative for dizziness, light-headedness and numbness. Head hurts when I cough   All other systems reviewed and are negative. Physical Exam   Physical Exam   Vital signs and nursing notes reviewed    CONSTITUTIONAL: Alert, in no moderate distress distress; well-developed; well-nourished. HEAD:  Normocephalic, atraumatic  EYES: PERRL; EOM's intact. ENTM: Nose: no rhinorrhea; Throat: Positive erythema in the posterior oropharynx, no exudate, mucous membranes moist  Neck:  Supple. trachea is midline. No stridor on auscultation  RESP: Chest clear, equal breath sounds but with slight wheeze and notable congestion. Patient easily coughs with deep breath. CV: S1 and S2 WNL; No murmurs, gallops or rubs. 2+ radial and DP pulses bilaterally. GI: non-distended, normal bowel sounds, abdomen soft and non-tender. No masses or organomegaly. : No costo-vertebral angle tenderness. BACK:  Non-tender, normal appearance  UPPER EXT:  Normal inspection. no joint or soft tissue swelling  LOWER EXT: No edema, no calf tenderness. NEURO: Alert and oriented x3, 5/5 strength and light touch sensation intact in bilateral upper and lower extremities. SKIN: No rashes;  Warm and dry  PSYCH: Normal mood, normal affect    Diagnostic Study Results     Labs -     Recent Results (from the past 12 hour(s))   LACTIC ACID    Collection Time: 03/19/19  7:42 AM   Result Value Ref Range    Lactic acid 0.8 0.4 - 2.0 MMOL/L   CBC WITH AUTOMATED DIFF    Collection Time: 03/19/19  7:42 AM   Result Value Ref Range    WBC 7.2 3.6 - 11.0 K/uL    RBC 4.63 3.80 - 5.20 M/uL    HGB 13.7 11.5 - 16.0 g/dL    HCT 41.2 35.0 - 47.0 %    MCV 89.0 80.0 - 99.0 FL    MCH 29.6 26.0 - 34.0 PG    MCHC 33.3 30.0 - 36.5 g/dL    RDW 13.0 11.5 - 14.5 %    PLATELET 850 726 - 627 K/uL    MPV 10.4 8.9 - 12.9 FL    NRBC 0.0 0  WBC    ABSOLUTE NRBC 0.00 0.00 - 0.01 K/uL    NEUTROPHILS 86 (H) 32 - 75 %    LYMPHOCYTES 6 (L) 12 - 49 %    MONOCYTES 8 5 - 13 %    EOSINOPHILS 0 0 - 7 %    BASOPHILS 0 0 - 1 %    IMMATURE GRANULOCYTES 0 0.0 - 0.5 %    ABS. NEUTROPHILS 6.2 1.8 - 8.0 K/UL    ABS. LYMPHOCYTES 0.4 (L) 0.8 - 3.5 K/UL    ABS. MONOCYTES 0.6 0.0 - 1.0 K/UL    ABS. EOSINOPHILS 0.0 0.0 - 0.4 K/UL    ABS. BASOPHILS 0.0 0.0 - 0.1 K/UL    ABS. IMM. GRANS. 0.0 0.00 - 0.04 K/UL    DF AUTOMATED      RBC COMMENTS NORMOCYTIC, NORMOCHROMIC     METABOLIC PANEL, COMPREHENSIVE    Collection Time: 03/19/19  7:42 AM   Result Value Ref Range    Sodium 137 136 - 145 mmol/L    Potassium 3.5 3.5 - 5.1 mmol/L    Chloride 102 97 - 108 mmol/L    CO2 27 21 - 32 mmol/L    Anion gap 8 5 - 15 mmol/L    Glucose 118 (H) 65 - 100 mg/dL    BUN 7 6 - 20 MG/DL    Creatinine 0.69 0.55 - 1.02 MG/DL    BUN/Creatinine ratio 10 (L) 12 - 20      GFR est AA >60 >60 ml/min/1.73m2    GFR est non-AA >60 >60 ml/min/1.73m2    Calcium 8.9 8.5 - 10.1 MG/DL    Bilirubin, total 0.3 0.2 - 1.0 MG/DL    ALT (SGPT) 23 12 - 78 U/L    AST (SGOT) 25 15 - 37 U/L    Alk.  phosphatase 90 45 - 117 U/L    Protein, total 8.2 6.4 - 8.2 g/dL    Albumin 3.9 3.5 - 5.0 g/dL    Globulin 4.3 (H) 2.0 - 4.0 g/dL    A-G Ratio 0.9 (L) 1.1 - 2.2     URINALYSIS W/ REFLEX CULTURE    Collection Time: 03/19/19  7:42 AM   Result Value Ref Range    Color YELLOW/STRAW      Appearance CLEAR CLEAR      Specific gravity 1.019 1.003 - 1.030      pH (UA) 6.0 5.0 - 8.0      Protein NEGATIVE  NEG mg/dL    Glucose NEGATIVE  NEG mg/dL    Ketone NEGATIVE  NEG mg/dL    Bilirubin NEGATIVE  NEG      Blood NEGATIVE  NEG      Urobilinogen 1.0 0.2 - 1.0 EU/dL    Nitrites NEGATIVE  NEG      Leukocyte Esterase MODERATE (A) NEG      WBC 5-10 0 - 4 /hpf    RBC 5-10 0 - 5 /hpf    Epithelial cells MODERATE (A) FEW /lpf    Bacteria NEGATIVE  NEG /hpf    UA:UC IF INDICATED URINE CULTURE ORDERED (A) CNI      Mucus 1+ (A) NEG /lpf   TROPONIN I    Collection Time: 03/19/19  7:42 AM   Result Value Ref Range    Troponin-I, Qt. 0.16 (H) <0.05 ng/mL   INFLUENZA A & B AG (RAPID TEST)    Collection Time: 03/19/19  8:20 AM   Result Value Ref Range    Influenza A Antigen POSITIVE (A) NEG      Influenza B Antigen NEGATIVE  NEG     EKG, 12 LEAD, SUBSEQUENT    Collection Time: 03/19/19 10:31 AM   Result Value Ref Range    Ventricular Rate 92 BPM    Atrial Rate 92 BPM    P-R Interval 148 ms    QRS Duration 90 ms    Q-T Interval 354 ms    QTC Calculation (Bezet) 437 ms    Calculated P Axis 51 degrees    Calculated R Axis 25 degrees    Calculated T Axis -11 degrees    Diagnosis       Normal sinus rhythm  Nonspecific T wave abnormality  When compared with ECG of 19-MAR-2019 02:40,  No significant change was found     TROPONIN I    Collection Time: 03/19/19 10:35 AM   Result Value Ref Range    Troponin-I, Qt. 0.14 (H) <0.05 ng/mL       Radiologic Studies -   XR CHEST PA LAT   Final Result   IMPRESSION: No acute cardiopulmonary process. CT Results  (Last 48 hours)    None        CXR Results  (Last 48 hours)               03/19/19 0317  XR CHEST PA LAT Final result    Impression:  IMPRESSION: No acute cardiopulmonary process. Narrative:  EXAM:  XR CHEST PA LAT       INDICATION:   cough       COMPARISON: Chest radiograph 10/20/2015. FINDINGS: PA and lateral radiographs of the chest were obtained. No evidence of focal consolidation. No pleural effusion or pneumothorax. Heart,   marcial, mediastinum are within normal limits. No acute osseous abnormalities. Multilevel degenerative disc disease throughout the thoracic spine. Medical Decision Making   I am the first provider for this patient.     I reviewed the vital signs, available nursing notes, past medical history, past surgical history, family history and social history. Vital Signs-Reviewed the patient's vital signs. Patient Vitals for the past 12 hrs:   Temp Pulse Resp BP SpO2   03/19/19 1255 100 °F (37.8 °C) 90 17 137/62 97 %   03/19/19 1112 98.4 °F (36.9 °C)       03/19/19 1100  88 19 114/66    03/19/19 1030  88 18 91/74 98 %   03/19/19 1000  (!) 103 21 127/71 96 %   03/19/19 0930  (!) 112 22 125/66 95 %   03/19/19 0900  99 15 136/62 93 %   03/19/19 0830  (!) 109 10 145/64 98 %   03/19/19 0747 100.4 °F (38 °C) (!) 104 30 167/83 100 %       Pulse Oximetry Analysis -99 % on room air    Cardiac Monitor:   Rate: 100 bpm  Rhythm: Tachycardic sinus rhythm. EKG interpretation: (Preliminary)  EKG performed at 2:40 AM shows a sinus rhythm at 100, tachycardia, normal axis, normal intervals, no visible acute ischemic changes. Repeat EKG performed at 10:31 AM shows a sinus rhythm at 92, normal axis normal intervals slight T wave flattening in V4 V5 V6. Records Reviewed: Nursing Notes and Old Medical Records    Provider Notes (Medical Decision Making):   69-year-old female with constellation of symptoms that suggest viral illness including influenza despite vaccination this past season. Patient does meet SIRS criteria, early sepsis bundle initiated for patient. Chemistry machines are down this morning prompting delay in laboratory evaluation to further evaluate for sepsis. No evidence of pneumonia. If flu test is negative, will empirically treat given high probability. The patient would benefit from initial nebulizer treatment, Tylenol for fever control, IV fluid therapy. ED Course:   Initial assessment performed. The patients presenting problems have been discussed, and they are in agreement with the care plan formulated and outlined with them. I have encouraged them to ask questions as they arise throughout their visit.     ED Course as of Mar 19 1635   Tue Mar 19, 2019   0945 Patient's troponin reported at 0.16 by the lab. Again reviewed patient's EKG on presentation with no acute ST changes. Went to the bedside, patient has been chest pain-free, including now, and reports only discomfort in her chest when she has been coughing during this recent illness. Patient reports she sees Dr. Ofe Turner, has no known history of coronary artery disease, has no stents, defibrillator or pacemaker. Plan is for repeat EKG at 1030 hrs. along with repeat troponin, will place phone call to Dr. Ofe Turner at that time for further management. Patient is feeling better on her DuoNeb, relayed fluid results, patient has been administered Tamiflu. [TL]   1059 Repeat EKG performed at 10:31 AM shows a sinus rhythm at 92 normal axis normal intervals mild T wave flattening in V4 V5 V6. Awaiting repeat troponin before calling Dr. Ofe Turner. [TL]   [address] Dr. Garcia Brought called. Discussed patient's presentation, exam, history which does include coronary artery disease, EKG and downward trending troponin. Given the patient has had no chest pain prior to presentation or during the ED visit, and a slight elevation in troponin can occur during illness, patient can be discharged with follow-up with Dr. Ofe Turner. [TL]      ED Course User Index  [TL] David Baca MD           Disposition:  Discharge    Discharge Note:  1:00 PM  The pt is ready for discharge. The pt's signs, symptoms, diagnosis, and discharge instructions have been discussed and pt has conveyed their understanding. The pt is to follow up as recommended or return to ER should their symptoms worsen. Plan has been discussed and pt is in agreement. PLAN:  1. Discharge Medication List as of 3/19/2019 12:53 PM      START taking these medications    Details   oseltamivir (TAMIFLU) 75 mg capsule Take 1 Cap by mouth two (2) times a day for 5 days. , Print, Disp-10 Cap, R-0      benzonatate (TESSALON PERLES) 100 mg capsule Take 2 Caps by mouth three (3) times daily as needed for Cough for up to 15 doses. , Print, Disp-15 Cap, R-0      albuterol sulfate 90 mcg/actuation aepb Take 2 Puffs by inhalation every four (4) hours for 10 days. With spacer, Print, Disp-1 Inhaler, R-2, WILBER         CONTINUE these medications which have NOT CHANGED    Details   multivitamin (ONE A DAY) tablet Take 1 Tab by mouth daily. , Historical Med      ibuprofen (MOTRIN) 800 mg tablet Take  by mouth., Historical Med      oxyCODONE IR (ROXICODONE) 10 mg tab immediate release tablet Take 0.5-1 Tabs by mouth every three (3) hours as needed. Max Daily Amount: 80 mg., Print, Disp-150 Tab, R-0      diazepam (VALIUM) 5 mg tablet Take 1 Tab by mouth every eight (8) hours as needed (Spasms). Max Daily Amount: 15 mg., Print, Disp-90 Tab, R-5      cholecalciferol, vitamin D3, (VITAMIN D3) 2,000 unit tab Take 2,000 Units by mouth. Indications: VITAMIN D DEFICIENCY, Historical Med      B.infantis-B.ani-B.long-B.bifi (PROBIOTIC 4X) 10-15 mg TbEC Take 1 Tab by mouth daily. , Historical Med      aspirin delayed-release 81 mg tablet Take 81 mg by mouth daily. , Historical Med      Cetirizine (ZYRTEC) 10 mg cap Take  by mouth., Historical Med      Hydrochlorothiazide (HYDRODIURIL) 12.5 mg tablet Take 12.5 mg by mouth daily. , Historical Med      simvastatin (ZOCOR) 20 mg tablet Take 20 mg by mouth nightly., Historical Med           2. Follow-up Information     Follow up With Specialties Details Why Contact Info    Lorrie Salcido MD Cardiology, [facility] Vascular Surgery, Internal Medicine  Please call Dr. Donell Cerda office for a follow-up in the next 2-5 days. [address] [place] [address]  [phone]      [address] [place] [address] EMERGENCY DEPT Emergency Medicine  If symptoms worsen including new shortness of breath, new chest pain, feeling worse despite taking flu medication or other new concerning symptoms.  [address] [place] [address]  [phone]        Return to ED if worse     Diagnosis     Clinical Impression:   1. Influenza A    2. Elevated troponin    3. Cough    4.  Body aches

## 2019-03-20 LAB
BACTERIA SPEC CULT: NORMAL
CC UR VC: NORMAL
SERVICE CMNT-IMP: NORMAL

## 2019-03-24 LAB
BACTERIA SPEC CULT: NORMAL
SERVICE CMNT-IMP: NORMAL

## 2019-04-10 ENCOUNTER — OFFICE VISIT (OUTPATIENT)
Dept: CARDIOLOGY CLINIC | Age: 64
End: 2019-04-10

## 2019-04-10 VITALS
WEIGHT: 199.2 LBS | OXYGEN SATURATION: 96 % | HEART RATE: 72 BPM | SYSTOLIC BLOOD PRESSURE: 132 MMHG | RESPIRATION RATE: 16 BRPM | HEIGHT: 65 IN | DIASTOLIC BLOOD PRESSURE: 100 MMHG | BODY MASS INDEX: 33.19 KG/M2

## 2019-04-10 DIAGNOSIS — R77.8 ELEVATED TROPONIN: ICD-10-CM

## 2019-04-10 DIAGNOSIS — I10 ESSENTIAL HYPERTENSION: ICD-10-CM

## 2019-04-10 DIAGNOSIS — K21.00 GERD WITH ESOPHAGITIS: ICD-10-CM

## 2019-04-10 DIAGNOSIS — R00.2 HEART PALPITATIONS: ICD-10-CM

## 2019-04-10 DIAGNOSIS — K21.00 GASTROESOPHAGEAL REFLUX DISEASE WITH ESOPHAGITIS: ICD-10-CM

## 2019-04-10 DIAGNOSIS — R06.02 SOB (SHORTNESS OF BREATH): Primary | ICD-10-CM

## 2019-04-10 DIAGNOSIS — R07.9 CHEST PAIN, UNSPECIFIED TYPE: ICD-10-CM

## 2019-04-10 DIAGNOSIS — E78.2 MIXED HYPERLIPIDEMIA: ICD-10-CM

## 2019-04-10 RX ORDER — PROMETHAZINE HYDROCHLORIDE AND DEXTROMETHORPHAN HYDROBROMIDE 6.25; 15 MG/5ML; MG/5ML
SYRUP ORAL
Refills: 1 | COMMUNITY
Start: 2019-03-26

## 2019-04-10 RX ORDER — ALBUTEROL SULFATE 90 UG/1
AEROSOL, METERED RESPIRATORY (INHALATION)
Refills: 2 | COMMUNITY
Start: 2019-03-19

## 2019-04-10 NOTE — PROGRESS NOTES
1. Have you been to the ER, urgent care clinic since your last visit? Hospitalized since your last visit? SEEN 3/19/19 FOR COUGH. 2. Have you seen or consulted any other health care providers outside of the [address] [place] [address] since your last visit? Include any pap smears or colon screening. SEEN BY PCP. Chief Complaint Patient presents with  Shortness of Breath   LAST SEEN 2017- CHEST PAIN ON MONDAY IN MIDDLE OF CHEST , HEART RACING

## 2019-04-11 ENCOUNTER — TELEPHONE (OUTPATIENT)
Dept: CARDIOLOGY CLINIC | Age: 64
End: 2019-04-11

## 2019-04-11 PROBLEM — K21.00 GERD WITH ESOPHAGITIS: Status: ACTIVE | Noted: 2019-04-11

## 2019-04-11 PROBLEM — R06.02 SOB (SHORTNESS OF BREATH): Status: ACTIVE | Noted: 2019-04-11

## 2019-04-11 PROBLEM — K21.9 GERD (GASTROESOPHAGEAL REFLUX DISEASE): Status: ACTIVE | Noted: 2019-04-11

## 2019-04-11 NOTE — TELEPHONE ENCOUNTER
----- Message from Nancy River MD sent at 4/11/2019 12:53 PM EDT -----  Regarding: stress echo  SHIRIN,    After thinking about her and her symptoms after f/u yesterday, lets get stress echo. Tell her we want to make sure blood flow to heart okay.     If more palpitations then EM FOR 2 WEEKS    B

## 2019-04-11 NOTE — PROGRESS NOTES
[address] [place] [address]        [phone] NEW PATIENT HPI/FOLLOW-UP 
 
NAME:  Kasey Yang :   1955 MRN:   [id number] PCP:  Maged Mcgovern MD 
 
    
 
Subjective: The patient is a 75y.o. year old female  who returns for a routine follow-up after visit to ED ED [facility] for what was diagnosed as \"Influenza A\" and discharged on Tamiflu and Tessalon for cough. During that visit, troponins were found to be 0.16> 0.14. Obtained because of SOB and atypical chest pain during coughing. Was advised to return for f/u. Noted is that patient had cardiac w/u in  for chest pain. NST negative but with inferior artifact. Has had episode of non-exertional mid-sternal chest pain and palpitations since discharge. Has been slow to recover from \"flu\". On bronchodilator since ED discharge. Denies change in exercise tolerance,edema, medication intolerance, shortness of breath, PND/orthopnea wheezing, sputum, syncope, dizziness or light headedness. Review of SystemsGeneral: Pt denies excessive weight gain or loss. Pt is able to conduct ADL's. Respiratory: Denies shortness of breath, MARTINEZ, wheezing or stridor. Cardiovascular: +Mid-sternal chest pain, +palpitations, edema or PND Gastrointestinal: Denies poor appetite, indigestion, abdominal pain or blood in stool Peripheral vascular: Denies claudication, leg cramps Neurological: Denies paresthesias, tingling.numbness Psychiatric: Denies anxiety,depression,fatigue Musculoskeletal: Denies pain,tenderness, soreness,swelling Past Medical History:  
Diagnosis Date  Arthritis  GERD (gastroesophageal reflux disease)  Hypertension  Ill-defined condition   
 elevated cholesterol  Ill-defined condition   
 seasonal allergies Patient Active Problem List  
 Diagnosis Date Noted  GERD with esophagitis 2019  GERD (gastroesophageal reflux disease) 2019  SOB (shortness of breath) 04/11/2019  Chest pain--with cough 3/19(flu) 05/05/2017  Mixed hyperlipidemia 05/05/2017  Essential hypertension 05/05/2017  Heart palpitations 05/05/2017 Past Surgical History:  
Procedure Laterality Date  BREAST SURGERY PROCEDURE UNLISTED    
 cysts removed bilateral breasts  HX BREAST BIOPSY Bilateral   
 long time ago--1980's  HX ORTHOPAEDIC  10/27/15 C4-C6 ANTERIOR CERVICAL DISCECTOMY WITH FUSION  
 HX OTHER SURGICAL    
 colonoscopy Allergies Allergen Reactions  Latex Rash Family History Problem Relation Age of Onset  Diabetes Mother  Lung Disease Father  Breast Cancer Sister 50's Social History Socioeconomic History  Marital status: SINGLE Spouse name: Not on file  Number of children: Not on file  Years of education: Not on file  Highest education level: Not on file Occupational History  Not on file Social Needs  Financial resource strain: Not on file  Food insecurity:  
  Worry: Not on file Inability: Not on file  Transportation needs:  
  Medical: Not on file Non-medical: Not on file Tobacco Use  Smoking status: Former Smoker Packs/day: 0.25 Years: 0.50 Pack years: 0.12 Types: Cigarettes  Smokeless tobacco: Never Used  Tobacco comment: SOCIAL SMOKER Substance and Sexual Activity  Alcohol use: Yes Comment: HAS WINE OCCASIONALLY  Drug use: No  
 Sexual activity: Not on file Lifestyle  Physical activity:  
  Days per week: Not on file Minutes per session: Not on file  Stress: Not on file Relationships  Social connections:  
  Talks on phone: Not on file Gets together: Not on file Attends Restorationism service: Not on file Active member of club or organization: Not on file Attends meetings of clubs or organizations: Not on file Relationship status: Not on file  Intimate partner violence: Fear of current or ex partner: Not on file Emotionally abused: Not on file Physically abused: Not on file Forced sexual activity: Not on file Other Topics Concern  Not on file Social History Narrative  Not on file Current Outpatient Medications Medication Sig  promethazine-dextromethorphan (PROMETHAZINE-DM) 6.25-15 mg/5 mL syrup TAKE 1-2 TEASPOONFULS BY MOUTH EVERY 6 HOURS AS NEEDED  VENTOLIN HFA 90 mcg/actuation inhaler TAKE 2 PUFFS BY INHALATION EVERY 4 HOURS FOR 10 DAYS WITH SPACER  
 multivitamin (ONE A DAY) tablet Take 1 Tab by mouth daily.  cholecalciferol, vitamin D3, (VITAMIN D3) 2,000 unit tab Take 2,000 Units by mouth. Indications: VITAMIN D DEFICIENCY  
 B.infantis-B.ani-B.long-B.bifi (PROBIOTIC 4X) 10-15 mg TbEC Take 1 Tab by mouth daily.  aspirin delayed-release 81 mg tablet Take 81 mg by mouth daily.  Cetirizine (ZYRTEC) 10 mg cap Take  by mouth as needed.  Hydrochlorothiazide (HYDRODIURIL) 12.5 mg tablet Take 12.5 mg by mouth daily.  simvastatin (ZOCOR) 20 mg tablet Take 20 mg by mouth nightly.  ibuprofen (MOTRIN) 800 mg tablet Take  by mouth.  oxyCODONE IR (ROXICODONE) 10 mg tab immediate release tablet Take 0.5-1 Tabs by mouth every three (3) hours as needed. Max Daily Amount: 80 mg.  
 diazepam (VALIUM) 5 mg tablet Take 1 Tab by mouth every eight (8) hours as needed (Spasms). Max Daily Amount: 15 mg. No current facility-administered medications for this visit. I have reviewed the nurses notes, vitals, problem list, allergy list, medical history, family medical, social history and medications. Objective:  
 
Physical Exam:  
 
Vitals:  
 04/10/19 1323 04/10/19 1337 BP: (!) 130/100 (!) 132/100 Pulse: 72 Resp: 16 SpO2: 96% Weight: 199 lb 3.2 oz (90.4 kg) Height: 5' 5\" (1.651 m) Body mass index is 33.15 kg/m². General: Well developed, in no acute distress. HEENT: No carotid bruits, no JVD, trach is midline. Heart:  Normal S1/S2 negative S3 or S4. Regular, no murmur, gallop or rub.  
Respiratory: Clear bilaterally, no wheezing or rales; left costochondral tenderness--moderate Abdomen:   Soft, non-tender, bowel sounds are active.  
Extremities:  No edema, normal cap refill, no cyanosis. Neuro: A&Ox3, speech clear, gait stable. Skin: Skin color is normal. No rashes or lesions. No diaphoresis. Vascular: 2+ pulses symmetric in all extremities Data Review:  
 
 
Cardiographics: 
 
EKG: NSR,LAE Cardiology Labs: 
 
Results for orders placed or performed during the hospital encounter of 03/19/19 EKG, 12 LEAD, INITIAL Result Value Ref Range Ventricular Rate 100 BPM  
 Atrial Rate 100 BPM  
 P-R Interval 160 ms QRS Duration 88 ms Q-T Interval 314 ms QTC Calculation (Bezet) 405 ms Calculated P Axis 61 degrees Calculated R Axis 46 degrees Calculated T Axis -16 degrees Diagnosis Normal sinus rhythm Possible Left atrial enlargement Nonspecific T wave abnormality When compared with ECG of 20-OCT-2015 09:32, No significant change was found Confirmed by Rae Parr ([id number]) on 3/19/2019 10:35:00 AM 
  
 
 
No results found for: CHOL, CHOLX, CHLST, [address], [id number], HDL, LDL, LDLC, DLDLP, TGLX, TRIGL, TRIGP, CHHD, CHHDX Lab Results Component Value Date/Time Sodium 137 03/19/2019 07:42 AM  
 Potassium 3.5 03/19/2019 07:42 AM  
 Chloride 102 03/19/2019 07:42 AM  
 CO2 27 03/19/2019 07:42 AM  
 Anion gap 8 03/19/2019 07:42 AM  
 Glucose 118 (H) 03/19/2019 07:42 AM  
 BUN 7 03/19/2019 07:42 AM  
 Creatinine 0.69 03/19/2019 07:42 AM  
 BUN/Creatinine ratio 10 (L) 03/19/2019 07:42 AM  
 GFR est AA >60 03/19/2019 07:42 AM  
 GFR est non-AA >60 03/19/2019 07:42 AM  
 Calcium 8.9 03/19/2019 07:42 AM  
 Bilirubin, total 0.3 03/19/2019 07:42 AM  
 AST (SGOT) 25 03/19/2019 07:42 AM  
 Alk.  phosphatase 90 03/19/2019 07:42 AM  
 Protein, total 8.2 03/19/2019 07:42 AM  
 Albumin 3.9 03/19/2019 07:42 AM  
 Globulin 4.3 (H) 03/19/2019 07:42 AM  
 A-G Ratio 0.9 (L) 03/19/2019 07:42 AM  
 ALT (SGPT) 23 03/19/2019 07:42 AM  
  
 
 
Assessment: ICD-10-CM ICD-9-CM 1. SOB (shortness of breath) R06.02 786.05 AMB POC EKG ROUTINE W/ 12 LEADS, INTER & REP  
   ECHO STRESS 2. Essential hypertension I10 401.9 ECHO STRESS 3. Mixed hyperlipidemia E78.2 272.2 ECHO STRESS 4. Chest pain, unspecified type R07.9 786.50 ECHO STRESS 5. Heart palpitations R00.2 785.1 ECHO STRESS 6. GERD with esophagitis K21.0 530.11   
7. Gastroesophageal reflux disease with esophagitis K21.0 530.11   
8. Elevated troponin R74.8 790.6 AMB POC EKG ROUTINE W/ 12 LEADS, INTER & REP  
   ECHO STRESS Discussion: Patient presents at this time stable from a cardiac perspective. Reassured chest pain likely musculoskeletal/costochondral. SOB possibly reactive airway disease related to allergies,mild asthma during early spring. Doubt ACS as w/u negative in 2017. However with elevated troponins in ED 3/19/19 and CAD risk factors, will repeat stress test as stress echo. If recurrent palpitations, then EM as infrequent. Pleased with present status. Plan: 1. Continue same meds. Lipid profile and labs followed by PCP. 2.Encouraged to exercise to tolerance, lose weight and follow low fat, low cholesterol, low sodium predominantly Plant-based (consider Mediterranean) diet. Call with questions or concerns. Will follow up any test results by phone and/or f/u here in office if needed. Mabeline Sink 3.Follow up:6 MONTHS OR SOONER IF NEEDED. I have discussed the diagnosis with the patient and the intended plan as seen in the above orders. The patient has received an after-visit summary and questions were answered concerning future plans. I have discussed any concerning medication side effects and warnings with the patient as well.  
 
Mely Craven MD 
 4/11/2019

## 2019-04-12 NOTE — TELEPHONE ENCOUNTER
Stress echo is scheduled 4-22-19. Wanted to advise pt we will get event  monitor if her palps continue.

## 2019-04-23 ENCOUNTER — TELEPHONE (OUTPATIENT)
Dept: CARDIOLOGY CLINIC | Age: 64
End: 2019-04-23

## 2019-04-23 NOTE — TELEPHONE ENCOUNTER
----- Message from Fern Marin MD sent at 4/22/2019 10:19 PM EDT -----  Regarding: stress echo  Normal    B  ----- Message -----  From: Qasim Sagastume MD  Sent: 4/22/2019   3:13 PM  To:  Fern Marin MD

## 2019-05-01 ENCOUNTER — TELEPHONE (OUTPATIENT)
Dept: CARDIOLOGY CLINIC | Age: 64
End: 2019-05-01

## 2019-05-01 NOTE — TELEPHONE ENCOUNTER
Spoke with patient. Verified patient with two patient identifiers. States her palps come and go, not frequently at this point. Will call us back if palps become bothersome and will order event monitor. Patient verbalized understanding.

## 2019-05-01 NOTE — TELEPHONE ENCOUNTER
----- Message from Robert Lebron MD sent at 4/22/2019 10:19 PM EDT -----  Regarding: stress echo  Normal    B  ----- Message -----  From: Tee Ambrosio MD  Sent: 4/22/2019   3:13 PM  To:  Robert Lebron MD

## 2019-05-01 NOTE — TELEPHONE ENCOUNTER
Finally reached pt, see other charted notes. Advised Stress echo normal.  Patient verbalized understanding.

## 2019-10-16 ENCOUNTER — OFFICE VISIT (OUTPATIENT)
Dept: CARDIOLOGY CLINIC | Age: 64
End: 2019-10-16

## 2019-10-16 VITALS
DIASTOLIC BLOOD PRESSURE: 96 MMHG | HEIGHT: 65 IN | RESPIRATION RATE: 8 BRPM | BODY MASS INDEX: 34.32 KG/M2 | HEART RATE: 66 BPM | OXYGEN SATURATION: 96 % | WEIGHT: 206 LBS | SYSTOLIC BLOOD PRESSURE: 156 MMHG

## 2019-10-16 DIAGNOSIS — E78.2 MIXED HYPERLIPIDEMIA: ICD-10-CM

## 2019-10-16 DIAGNOSIS — K21.00 GERD WITH ESOPHAGITIS: ICD-10-CM

## 2019-10-16 DIAGNOSIS — I10 ESSENTIAL HYPERTENSION: ICD-10-CM

## 2019-10-16 DIAGNOSIS — R06.02 SOB (SHORTNESS OF BREATH): ICD-10-CM

## 2019-10-16 DIAGNOSIS — R07.89 ATYPICAL CHEST PAIN: Primary | ICD-10-CM

## 2019-10-16 DIAGNOSIS — R00.2 HEART PALPITATIONS: ICD-10-CM

## 2019-10-16 NOTE — PROGRESS NOTES
1. Have you been to the ER, urgent care clinic since your last visit? Hospitalized since your last visit? No    2. Have you seen or consulted any other health care providers outside of the [address] [place] [address] since your last visit? Include any pap smears or colon screening. No    Chief Complaint   Patient presents with    Hypertension     6 mo appt. Denied cardiac symptoms.

## 2019-10-16 NOTE — PROGRESS NOTES
[address] [place], [place] [phone]                             NEW PATIENT HPI/FOLLOW-UP    NAME:  Mavis Razo   :   1955   MRN:   [id number]   PCP:  Wes Araiza MD           Subjective: The patient is a 75y.o. year old female  who returns for a routine follow-up. Since the last visit, patient reports intermittent non-exertional mid-sternal chest pain and indigestion. Had negative stress echo . Denies change in exercise tolerance, edema, medication intolerance, palpitations, shortness of breath, PND/orthopnea wheezing, sputum, syncope, dizziness or light headedness. Doing satisfactorily otherwise      Review of Systems  General: Pt denies excessive weight gain or loss. Pt is able to conduct ADL's. Respiratory: Denies shortness of breath, MARTINEZ, wheezing or stridor.   Cardiovascular: + mid-sternal chest pain, -palpitations, edema or PND  Gastrointestinal: Denies poor appetite, +indigestion, abdominal pain or blood in stool  Peripheral vascular: Denies claudication, leg cramps  Neurological: Denies paresthesias, tingling.numbness  Psychiatric: Denies anxiety,depression,fatigue  Musculoskeletal: Denies pain,tenderness, soreness,swelling      Past Medical History:   Diagnosis Date    Arthritis     GERD (gastroesophageal reflux disease)     Hypertension     Ill-defined condition     elevated cholesterol    Ill-defined condition     seasonal allergies     Patient Active Problem List    Diagnosis Date Noted    GERD with esophagitis 2019    GERD (gastroesophageal reflux disease) 2019    SOB (shortness of breath) 2019    Chest pain--with cough 3/19(flu) 2017    Mixed hyperlipidemia 2017    Essential hypertension 2017    Heart palpitations 2017      Past Surgical History:   Procedure Laterality Date    BREAST SURGERY PROCEDURE UNLISTED      cysts removed bilateral breasts    HX BREAST BIOPSY Bilateral long time ago--1980's    HX ORTHOPAEDIC  10/27/15    C4-C6 ANTERIOR CERVICAL DISCECTOMY WITH FUSION    HX OTHER SURGICAL      colonoscopy     Allergies   Allergen Reactions    Latex Rash      Family History   Problem Relation Age of Onset    Diabetes Mother     Lung Disease Father     Breast Cancer Sister         52's      Social History     Socioeconomic History    Marital status: SINGLE     Spouse name: Not on file    Number of children: Not on file    Years of education: Not on file    Highest education level: Not on file   Occupational History    Not on file   Social Needs    Financial resource strain: Not on file    Food insecurity:     Worry: Not on file     Inability: Not on file    Transportation needs:     Medical: Not on file     Non-medical: Not on file   Tobacco Use    Smoking status: Former Smoker     Packs/day: 0.25     Years: 0.50     Pack years: 0.12     Types: Cigarettes    Smokeless tobacco: Never Used    Tobacco comment: SOCIAL SMOKER   Substance and Sexual Activity    Alcohol use: Yes     Comment: HAS WINE OCCASIONALLY    Drug use: No    Sexual activity: Not on file   Lifestyle    Physical activity:     Days per week: Not on file     Minutes per session: Not on file    Stress: Not on file   Relationships    Social connections:     Talks on phone: Not on file     Gets together: Not on file     Attends Roman Catholic service: Not on file     Active member of club or organization: Not on file     Attends meetings of clubs or organizations: Not on file     Relationship status: Not on file    Intimate partner violence:     Fear of current or ex partner: Not on file     Emotionally abused: Not on file     Physically abused: Not on file     Forced sexual activity: Not on file   Other Topics Concern    Not on file   Social History Narrative    Not on file      Current Outpatient Medications   Medication Sig    promethazine-dextromethorphan (PROMETHAZINE-DM) 6.25-15 mg/5 mL syrup TAKE 1-2 TEASPOONFULS BY MOUTH EVERY 6 HOURS AS NEEDED    VENTOLIN HFA 90 mcg/actuation inhaler TAKE 2 PUFFS BY INHALATION EVERY 4 HOURS FOR 10 DAYS WITH SPACER    multivitamin (ONE A DAY) tablet Take 1 Tab by mouth daily.  ibuprofen (MOTRIN) 800 mg tablet Take  by mouth.  oxyCODONE IR (ROXICODONE) 10 mg tab immediate release tablet Take 0.5-1 Tabs by mouth every three (3) hours as needed. Max Daily Amount: 80 mg.    diazepam (VALIUM) 5 mg tablet Take 1 Tab by mouth every eight (8) hours as needed (Spasms). Max Daily Amount: 15 mg.  cholecalciferol, vitamin D3, (VITAMIN D3) 2,000 unit tab Take 2,000 Units by mouth. Indications: VITAMIN D DEFICIENCY    B.infantis-B.ani-B.long-B.bifi (PROBIOTIC 4X) 10-15 mg TbEC Take 1 Tab by mouth daily.  aspirin delayed-release 81 mg tablet Take 81 mg by mouth daily.  Cetirizine (ZYRTEC) 10 mg cap Take  by mouth as needed.  Hydrochlorothiazide (HYDRODIURIL) 12.5 mg tablet Take 12.5 mg by mouth daily.  simvastatin (ZOCOR) 20 mg tablet Take 20 mg by mouth nightly. No current facility-administered medications for this visit. I have reviewed the nurses notes, vitals, problem list, allergy list, medical history, family medical, social history and medications. Objective:     Physical Exam:     Vitals:    10/16/19 1319   BP: (!) 156/96   Pulse: 66   Resp: 8   SpO2: 96%   Weight: 206 lb (93.4 kg)   Height: 5' 5\" (1.651 m)    Body mass index is 34.28 kg/m². General: Well developed, in no acute distress. HEENT: No carotid bruits, no JVD, trach is midline. Heart:  Normal S1/S2 negative S3 or S4. Regular, no murmur, gallop or rub.   Respiratory: Clear bilaterally, no wheezing or rales  Abdomen:   Soft, non-tender, bowel sounds are active.   Extremities:  No edema, normal cap refill, no cyanosis. Neuro: A&Ox3, speech clear, gait stable. Skin: Skin color is normal. No rashes or lesions. No diaphoresis.   Vascular: 2+ pulses symmetric in all extremities        Data Review:       Cardiographics:    EKG: NSR,LAE 4/10/19    Stress echo Interpretation Summary 4/22/19:    · Baseline ECG: Normal EKG, non-specific ST-T wave abnormalities. · Negative stress test.  · Normal stress echocardiogram. Low risk study. Cardiology Labs:    Results for orders placed or performed during the hospital encounter of 03/19/19   EKG, 12 LEAD, INITIAL   Result Value Ref Range    Ventricular Rate 100 BPM    Atrial Rate 100 BPM    P-R Interval 160 ms    QRS Duration 88 ms    Q-T Interval 314 ms    QTC Calculation (Bezet) 405 ms    Calculated P Axis 61 degrees    Calculated R Axis 46 degrees    Calculated T Axis -16 degrees    Diagnosis       Normal sinus rhythm  Possible Left atrial enlargement  Nonspecific T wave abnormality    When compared with ECG of 20-OCT-2015 09:32,  No significant change was found  Confirmed by Antonino Up ([id number]) on 3/19/2019 10:35:00 AM         No results found for: CHOL, CHOLX, CHLST, CHOLV, [id number], HDL, HDLP, LDL, LDLC, DLDLP, TGLX, TRIGL, TRIGP, CHHD, [facility]    Lab Results   Component Value Date/Time    Sodium 137 03/19/2019 07:42 AM    Potassium 3.5 03/19/2019 07:42 AM    Chloride 102 03/19/2019 07:42 AM    CO2 27 03/19/2019 07:42 AM    Anion gap 8 03/19/2019 07:42 AM    Glucose 118 (H) 03/19/2019 07:42 AM    BUN 7 03/19/2019 07:42 AM    Creatinine 0.69 03/19/2019 07:42 AM    BUN/Creatinine ratio 10 (L) 03/19/2019 07:42 AM    GFR est AA >60 03/19/2019 07:42 AM    GFR est non-AA >60 03/19/2019 07:42 AM    Calcium 8.9 03/19/2019 07:42 AM    Bilirubin, total 0.3 03/19/2019 07:42 AM    AST (SGOT) 25 03/19/2019 07:42 AM    Alk. phosphatase 90 03/19/2019 07:42 AM    Protein, total 8.2 03/19/2019 07:42 AM    Albumin 3.9 03/19/2019 07:42 AM    Globulin 4.3 (H) 03/19/2019 07:42 AM    A-G Ratio 0.9 (L) 03/19/2019 07:42 AM    ALT (SGPT) 23 03/19/2019 07:42 AM          Assessment:       ICD-10-CM ICD-9-CM    1. Atypical chest pain R07.89 786.59    2. Mixed hyperlipidemia E78.2 272.2    3. Essential hypertension I10 401.9 AMB POC EKG ROUTINE W/ 12 LEADS, INTER & REP   4. GERD with esophagitis K21.0 530.11    5. Heart palpitations R00.2 785.1    6. SOB (shortness of breath) R06.02 786.05          Discussion: Patient presents at this time stable from a cardiac perspective. Chest pain non-cardiac. Suspect GI. Advised PPI Prilosec 20 mg BID for 1 week then 20 mg every day for several weeks. Pleased with present cardiac status. Plan: 1. Continue same meds. Lipid profile and labs followed by PCP. 2.Encouraged to exercise to tolerance, lose weight and follow low fat, low cholesterol, low sodium predominantly Plant-based (consider Mediterranean) diet. Call with questions or concerns. Will follow up any test results by phone and/or f/u here in office if needed. Brittany Ayala 3.Follow up: 6 months    I have discussed the diagnosis with the patient and the intended plan as seen in the above orders. The patient has received an after-visit summary and questions were answered concerning future plans. I have discussed any concerning medication side effects and warnings with the patient as well.     Joyce Hernandez MD,FACC,[facility]  10/16/2019

## 2019-11-18 ENCOUNTER — HOSPITAL ENCOUNTER (OUTPATIENT)
Dept: MAMMOGRAPHY | Age: 64
Discharge: HOME | End: 2019-11-18
Attending: OBSTETRICS & GYNECOLOGY
Payer: MEDICARE

## 2019-11-18 DIAGNOSIS — Z12.31 VISIT FOR SCREENING MAMMOGRAM: ICD-10-CM

## 2019-11-18 PROCEDURE — 77063 BREAST TOMOSYNTHESIS BI: CPT

## 2020-11-19 ENCOUNTER — HOSPITAL ENCOUNTER (OUTPATIENT)
Dept: MAMMOGRAPHY | Age: 65
Discharge: HOME | End: 2020-11-19
Attending: OBSTETRICS & GYNECOLOGY
Payer: MEDICARE

## 2020-11-19 DIAGNOSIS — Z12.31 VISIT FOR SCREENING MAMMOGRAM: ICD-10-CM

## 2020-11-19 PROCEDURE — 77063 BREAST TOMOSYNTHESIS BI: CPT

## 2021-10-18 ENCOUNTER — TRANSCRIBE ORDER (OUTPATIENT)
Dept: SCHEDULING | Age: 66
End: 2021-10-18

## 2021-10-18 DIAGNOSIS — Z12.31 SCREENING MAMMOGRAM FOR HIGH-RISK PATIENT: Primary | ICD-10-CM

## 2022-01-11 ENCOUNTER — HOSPITAL ENCOUNTER (OUTPATIENT)
Dept: MAMMOGRAPHY | Age: 67
Discharge: HOME | End: 2022-01-11
Attending: FAMILY MEDICINE
Payer: MEDICARE

## 2022-01-11 DIAGNOSIS — Z12.31 SCREENING MAMMOGRAM FOR HIGH-RISK PATIENT: ICD-10-CM

## 2022-01-11 PROCEDURE — 77063 BREAST TOMOSYNTHESIS BI: CPT

## 2022-03-18 PROBLEM — K21.00 GERD WITH ESOPHAGITIS: Status: ACTIVE | Noted: 2019-04-11

## 2022-03-19 PROBLEM — R07.89 ATYPICAL CHEST PAIN: Status: ACTIVE | Noted: 2017-05-05

## 2022-03-19 PROBLEM — I10 ESSENTIAL HYPERTENSION: Status: ACTIVE | Noted: 2017-05-05

## 2022-03-19 PROBLEM — R00.2 HEART PALPITATIONS: Status: ACTIVE | Noted: 2017-05-05

## 2022-03-19 PROBLEM — K21.9 GERD (GASTROESOPHAGEAL REFLUX DISEASE): Status: ACTIVE | Noted: 2019-04-11

## 2022-03-19 PROBLEM — R06.02 SOB (SHORTNESS OF BREATH): Status: ACTIVE | Noted: 2019-04-11

## 2022-03-20 PROBLEM — E78.2 MIXED HYPERLIPIDEMIA: Status: ACTIVE | Noted: 2017-05-05

## 2023-02-17 ENCOUNTER — TRANSCRIBE ORDER (OUTPATIENT)
Dept: SCHEDULING | Age: 68
End: 2023-02-17

## 2023-02-17 DIAGNOSIS — Z12.31 VISIT FOR SCREENING MAMMOGRAM: Primary | ICD-10-CM

## 2023-03-23 ENCOUNTER — HOSPITAL ENCOUNTER (OUTPATIENT)
Dept: MAMMOGRAPHY | Age: 68
Discharge: HOME | End: 2023-03-23
Attending: FAMILY MEDICINE
Payer: MEDICARE

## 2023-03-23 DIAGNOSIS — Z12.31 VISIT FOR SCREENING MAMMOGRAM: ICD-10-CM

## 2023-03-23 PROCEDURE — 77063 BREAST TOMOSYNTHESIS BI: CPT

## 2024-03-05 ENCOUNTER — TRANSCRIBE ORDERS (OUTPATIENT)
Facility: HOSPITAL | Age: 69
End: 2024-03-05

## 2024-03-05 DIAGNOSIS — Z12.31 VISIT FOR SCREENING MAMMOGRAM: Primary | ICD-10-CM

## 2024-03-26 ENCOUNTER — HOSPITAL ENCOUNTER (OUTPATIENT)
Facility: HOSPITAL | Age: 69
Discharge: HOME | End: 2024-03-29
Attending: FAMILY MEDICINE
Payer: MEDICARE

## 2024-03-26 DIAGNOSIS — Z12.31 VISIT FOR SCREENING MAMMOGRAM: ICD-10-CM

## 2024-03-26 PROCEDURE — 77063 BREAST TOMOSYNTHESIS BI: CPT

## 2024-04-17 ENCOUNTER — ANESTHESIA EVENT (OUTPATIENT)
Facility: HOSPITAL | Age: 69
End: 2024-04-17
Payer: MEDICARE

## 2024-04-17 ENCOUNTER — HOSPITAL ENCOUNTER (OUTPATIENT)
Facility: HOSPITAL | Age: 69
Setting detail: OUTPATIENT SURGERY
Discharge: HOME | End: 2024-04-17
Attending: INTERNAL MEDICINE | Admitting: INTERNAL MEDICINE
Payer: MEDICARE

## 2024-04-17 ENCOUNTER — ANESTHESIA (OUTPATIENT)
Facility: HOSPITAL | Age: 69
End: 2024-04-17
Payer: MEDICARE

## 2024-04-17 VITALS
DIASTOLIC BLOOD PRESSURE: 69 MMHG | SYSTOLIC BLOOD PRESSURE: 143 MMHG | OXYGEN SATURATION: 98 % | HEIGHT: 66 IN | WEIGHT: 192 LBS | RESPIRATION RATE: 16 BRPM | BODY MASS INDEX: 30.86 KG/M2 | HEART RATE: 71 BPM | TEMPERATURE: 98.3 F

## 2024-04-17 PROCEDURE — 2709999900 HC NON-CHARGEABLE SUPPLY: Performed by: INTERNAL MEDICINE

## 2024-04-17 PROCEDURE — 3600007502: Performed by: INTERNAL MEDICINE

## 2024-04-17 PROCEDURE — 3600007512: Performed by: INTERNAL MEDICINE

## 2024-04-17 PROCEDURE — 88305 TISSUE EXAM BY PATHOLOGIST: CPT

## 2024-04-17 PROCEDURE — 7100000011 HC PHASE II RECOVERY - ADDTL 15 MIN: Performed by: INTERNAL MEDICINE

## 2024-04-17 PROCEDURE — 3700000001 HC ADD 15 MINUTES (ANESTHESIA): Performed by: INTERNAL MEDICINE

## 2024-04-17 PROCEDURE — 3700000000 HC ANESTHESIA ATTENDED CARE: Performed by: INTERNAL MEDICINE

## 2024-04-17 PROCEDURE — 7100000010 HC PHASE II RECOVERY - FIRST 15 MIN: Performed by: INTERNAL MEDICINE

## 2024-04-17 PROCEDURE — 2580000003 HC RX 258: Performed by: INTERNAL MEDICINE

## 2024-04-17 PROCEDURE — 2500000003 HC RX 250 WO HCPCS: Performed by: ANESTHESIOLOGY

## 2024-04-17 PROCEDURE — 6360000002 HC RX W HCPCS: Performed by: ANESTHESIOLOGY

## 2024-04-17 RX ORDER — LOSARTAN POTASSIUM 100 MG/1
100 TABLET ORAL DAILY
COMMUNITY

## 2024-04-17 RX ORDER — LIDOCAINE HYDROCHLORIDE 20 MG/ML
INJECTION, SOLUTION EPIDURAL; INFILTRATION; INTRACAUDAL; PERINEURAL PRN
Status: DISCONTINUED | OUTPATIENT
Start: 2024-04-17 | End: 2024-04-17 | Stop reason: SDUPTHER

## 2024-04-17 RX ORDER — SODIUM CHLORIDE 0.9 % (FLUSH) 0.9 %
5-40 SYRINGE (ML) INJECTION EVERY 12 HOURS SCHEDULED
Status: DISCONTINUED | OUTPATIENT
Start: 2024-04-17 | End: 2024-04-17 | Stop reason: HOSPADM

## 2024-04-17 RX ORDER — SODIUM CHLORIDE 9 MG/ML
25 INJECTION, SOLUTION INTRAVENOUS PRN
Status: DISCONTINUED | OUTPATIENT
Start: 2024-04-17 | End: 2024-04-17 | Stop reason: HOSPADM

## 2024-04-17 RX ORDER — SODIUM CHLORIDE 0.9 % (FLUSH) 0.9 %
5-40 SYRINGE (ML) INJECTION PRN
Status: DISCONTINUED | OUTPATIENT
Start: 2024-04-17 | End: 2024-04-17 | Stop reason: HOSPADM

## 2024-04-17 RX ADMIN — PROPOFOL 240 MG: 10 INJECTION, EMULSION INTRAVENOUS at 08:41

## 2024-04-17 RX ADMIN — SODIUM CHLORIDE 25 ML: 9 INJECTION, SOLUTION INTRAVENOUS at 08:05

## 2024-04-17 RX ADMIN — LIDOCAINE HYDROCHLORIDE 40 MG: 20 INJECTION, SOLUTION EPIDURAL; INFILTRATION; INTRACAUDAL; PERINEURAL at 08:28

## 2024-04-17 ASSESSMENT — PAIN - FUNCTIONAL ASSESSMENT: PAIN_FUNCTIONAL_ASSESSMENT: 0-10

## 2024-04-17 ASSESSMENT — ENCOUNTER SYMPTOMS: SHORTNESS OF BREATH: 1

## 2024-04-17 NOTE — OP NOTE
[facility]                  Colonoscopy Operative Report    4/17/2024      Amy Dash  [id number]  1955    Procedure Type:   Colonoscopy --screening     Indications:    Personal history of colon polyps (screening only)     Pre-operative Diagnosis: see indication above    Post-operative Diagnosis:  See findings below    :  ANN Lee Jr, MD    Referring Provider: Ziggy Byrne MD      Sedation:  MAC anesthesia Propofol    Pre-Procedural Exam:      Airway: clear,  No airway problems anticipated  Heart: RRR, without gallops or rubs  Lungs: clear bilaterally without wheezes, crackles, or rhonchi  Abdomen: soft, nontender, nondistended, bowel sounds present  Mental Status: awake, alert and oriented to person, place and time     Procedure Details:  After informed consent was obtained with all risks and benefits of procedure explained and preoperative exam completed, the patient was taken to the endoscopy suite and placed in the left lateral decubitus position.  Upon sequential sedation as per above, a digital rectal exam was performed .  The Olympus videocolonoscope  was inserted in the rectum and carefully advanced to the cecum, which was identified by the ileocecal valve and appendiceal orifice.  The cecum was identified by the ileocecal valve and appendiceal orifice.  The quality of preparation was good.  The colonoscope was slowly withdrawn with careful evaluation between folds. Retroflexion in the rectum was completed demonstrating internal hemorrhoids.     Findings:   Rectum: Grade 2 internal hemorrhoid(s);  Sigmoid: normal  Descending Colon: normal  Transverse Colon: near the hepatic flexure a 5-6 mm polyp was cold snared  Ascending Colon: normal  Cecum: normal  Terminal Ileum: not intubated      Specimen Removed:   hepatic flexure polyp    Complications: None.     EBL:  None.    Impression:     Hepatic flexure polyp; grade 2 internal

## 2024-04-17 NOTE — ANESTHESIA POSTPROCEDURE EVALUATION
Department of Anesthesiology  Postprocedure Note    Patient: Amy Dash  MRN: [id number]  YOB: 1955  Date of evaluation: 4/17/2024    Procedure Summary       Date: 04/17/24 Room / Location: [facility] 04 / [facility] ENDOSCOPY    Anesthesia Start: 0823 Anesthesia Stop: 0844    Procedure: COLONOSCOPY Diagnosis:       Personal history of colonic polyps      (Personal history of colonic polyps [Z86.010])    Surgeons: Vito Lee Jr., MD Responsible Provider: Angi Hurt DO    Anesthesia Type: TIVA ASA Status: 2            Anesthesia Type: TIVA    Lorri Phase I: Lorri Score: 10    Lorri Phase II: Lorri Score: 10    Anesthesia Post Evaluation    Patient location during evaluation: PACU  Patient participation: complete - patient participated  Level of consciousness: awake  Airway patency: patent  Nausea & Vomiting: no vomiting  Cardiovascular status: hemodynamically stable  Respiratory status: acceptable  Hydration status: euvolemic    No notable events documented.

## 2024-04-17 NOTE — H&P
Personal history of colonic polyps    Plan of Care/Planned Procedure: Colonoscopy with monitored anesthesia care sedation    Signed:  ANN Lee MD 4/17/2024

## 2024-04-17 NOTE — ANESTHESIA PRE PROCEDURE
Department of Anesthesiology  Preprocedure Note       Name:  Amy Dash   Age:  69 y.o.  :  1955                                          MRN:  [id number]         Date:  2024      Surgeon: Surgeon(s):  Vito Lee Jr., MD    Procedure: Procedure(s):  COLONOSCOPY    Medications prior to admission:   Prior to Admission medications    Medication Sig Start Date End Date Taking? Authorizing Provider   albuterol sulfate HFA (VENTOLIN HFA) 108 (90 Base) MCG/ACT inhaler TAKE 2 PUFFS BY INHALATION EVERY 4 HOURS FOR 10 DAYS WITH SPACER 3/19/19   Automatic Reconciliation, Ar   aspirin 81 MG EC tablet Take by mouth daily    Automatic Reconciliation, Ar   Cetirizine HCl 10 MG CAPS Take by mouth as needed    Automatic Reconciliation, Ar   Cholecalciferol 50 MCG (2000 UT) TABS Take by mouth    Automatic Reconciliation, Ar   diazePAM (VALIUM) 5 MG tablet Take by mouth every 8 hours as needed. 10/28/15   Automatic Reconciliation, Ar   hydroCHLOROthiazide (HYDRODIURIL) 12.5 MG tablet Take by mouth daily    Automatic Reconciliation, Ar   ibuprofen (ADVIL;MOTRIN) 800 MG tablet Take by mouth    Automatic Reconciliation, Ar   oxyCODONE HCl (OXY-IR) 10 MG immediate release tablet Take by mouth. 10/28/15   Automatic Reconciliation, Ar   promethazine-dextromethorphan (PROMETHAZINE-DM) 6.25-15 MG/5ML syrup TAKE 1-2 TEASPOONFULS BY MOUTH EVERY 6 HOURS AS NEEDED 3/26/19   Automatic Reconciliation, Ar   simvastatin (ZOCOR) 20 MG tablet Take by mouth    Automatic Reconciliation, Ar       Current medications:    No current facility-administered medications for this encounter.       Allergies:    Allergies   Allergen Reactions   • Latex Rash       Problem List:    Patient Active Problem List   Diagnosis Code   • GERD with esophagitis K21.00   • Essential hypertension I10   • Heart palpitations R00.2   • SOB (shortness of breath) R06.02   • GERD (gastroesophageal reflux disease) K21.9   • Atypical chest pain R07.89

## 2024-04-17 NOTE — PROGRESS NOTES
ARRIVAL INFORMATION:  Verified patient name and date of birth, scheduled procedure, and informed consent.     Belongings with patient include:  Clothing, 3 rings,1 pair of earrings, 1 watch    GI FOCUSED ASSESSMENT:  Neuro: Awake, alert, oriented x4  Respiratory: even and unlabored   GI: semi-soft and slightly distended  EKG Rhythm: normal sinus rhythm    Education:Reviewed general discharge instructions and  information.      Left atrial pressure for 2nd transeptal is

## 2024-04-17 NOTE — DISCHARGE INSTRUCTIONS
AMADEO Lee MD  [facility]  [address], [address]  [place] [address] [phone]  [email or web address]    Amy Dash  [id number]  1955    COLON DISCHARGE INSTRUCTIONS  Discomfort:  Redness at IV site- apply warm compress to area; if redness or soreness persist- contact your physician  There may be a slight amount of blood passed from the rectum  Gaseous discomfort- walking, belching will help relieve any discomfort  You may not operate a vehicle for 12 hours  You may not engage in an occupation involving machinery or appliances for rest of today  You may not drink alcoholic beverages for at least 12 hours  Avoid making any critical decisions for at least 24 hour  DIET:   High fiber diet.   - however -  remember your colon is empty and a heavy meal will produce gas.   Avoid these foods:  vegetables, fried / greasy foods, carbonated drinks for today      ACTIVITY:  You may resume your normal daily activities it is recommended that you spend the remainder of the day resting -  avoid any strenuous activity.    CALL M.D.  ANY SIGN OF:   Increasing pain, nausea, vomiting  Abdominal distension (swelling)  New increased bleeding (oral or rectal)  Fever (chills)  Pain in chest area  Bloody discharge from nose or mouth  Shortness of breath     COLONOSCOPY FINDINGS:  Your colonoscopy showed: one small polyp which was removed and medium sized internal hemorrhoids.    Follow-up Instructions:   Call Dr. ANN Lee if any questions or problems.   Telephone # [phone]  Dr. Lee's office will notify you of the biopsy results within 7 to 10 days.  Should have a repeat colonoscopy in 5 years.    Patient Education on Sedation / Analgesia Administered for Procedure      For 24 hours after general anesthesia or intravenous analgesia / sedation:  Have someone responsible help you with your care  Limit your activities  Do not drive and operate

## 2025-03-14 ENCOUNTER — TRANSCRIBE ORDERS (OUTPATIENT)
Facility: HOSPITAL | Age: 70
End: 2025-03-14

## 2025-03-14 DIAGNOSIS — Z12.31 OTHER SCREENING MAMMOGRAM: Primary | ICD-10-CM

## 2025-04-23 ENCOUNTER — HOSPITAL ENCOUNTER (OUTPATIENT)
Facility: HOSPITAL | Age: 70
Discharge: HOME | End: 2025-04-26
Attending: FAMILY MEDICINE
Payer: MEDICARE

## 2025-04-23 DIAGNOSIS — Z12.31 OTHER SCREENING MAMMOGRAM: ICD-10-CM

## 2025-04-23 PROCEDURE — 77063 BREAST TOMOSYNTHESIS BI: CPT

## (undated) DEVICE — CUFF BLD PRSS AD CLTH SGL TB W/ BAYNT CONN ROUNDED CORNER

## (undated) DEVICE — SNARE ENDOSCP POLYP MED STD AD 2.4X27X240 CM 2.8 MM OVL SENS

## (undated) DEVICE — CONTAINER SPEC 20 ML LID NEUT BUFF FORMALIN 10 % POLYPR STS

## (undated) DEVICE — TIP SUCT TRNSPAR RIB SURF STD BLB RIG NVENT W/ 5IN1 CONN DYND50138] MEDLINE INDUSTRIES INC]

## (undated) DEVICE — SET GRAV CK VLV NEEDLESS ST 3 GANGED 4WAY STPCOCK HI FLO 10

## (undated) DEVICE — IV START KIT: Brand: MEDLINE

## (undated) DEVICE — TRAP ENDOSCP POLYP 2 CHMBR DRAWER TYP